# Patient Record
Sex: MALE | Race: BLACK OR AFRICAN AMERICAN | Employment: OTHER | URBAN - METROPOLITAN AREA
[De-identification: names, ages, dates, MRNs, and addresses within clinical notes are randomized per-mention and may not be internally consistent; named-entity substitution may affect disease eponyms.]

---

## 2019-08-17 ENCOUNTER — APPOINTMENT (OUTPATIENT)
Dept: GENERAL RADIOLOGY | Age: 59
End: 2019-08-17
Payer: COMMERCIAL

## 2019-08-17 ENCOUNTER — HOSPITAL ENCOUNTER (EMERGENCY)
Age: 59
Discharge: HOME | End: 2019-08-17
Attending: EMERGENCY MEDICINE
Payer: COMMERCIAL

## 2019-08-17 VITALS
BODY MASS INDEX: 30.8 KG/M2 | OXYGEN SATURATION: 96 % | HEIGHT: 74 IN | RESPIRATION RATE: 16 BRPM | TEMPERATURE: 98.1 F | SYSTOLIC BLOOD PRESSURE: 91 MMHG | WEIGHT: 240 LBS | HEART RATE: 88 BPM | DIASTOLIC BLOOD PRESSURE: 66 MMHG

## 2019-08-17 DIAGNOSIS — R52 BODY ACHES: Primary | ICD-10-CM

## 2019-08-17 DIAGNOSIS — N30.00 ACUTE CYSTITIS WITHOUT HEMATURIA: ICD-10-CM

## 2019-08-17 DIAGNOSIS — R11.2 NON-INTRACTABLE VOMITING WITH NAUSEA, UNSPECIFIED VOMITING TYPE: ICD-10-CM

## 2019-08-17 LAB
A/G RATIO: 1.4 (ref 1.1–2.2)
ALBUMIN SERPL-MCNC: 4.4 G/DL (ref 3.4–5)
ALP BLD-CCNC: 56 U/L (ref 40–129)
ALT SERPL-CCNC: 52 U/L (ref 10–40)
ANION GAP SERPL CALCULATED.3IONS-SCNC: 11 MMOL/L (ref 3–16)
AST SERPL-CCNC: 44 U/L (ref 15–37)
BASOPHILS ABSOLUTE: 0 K/UL (ref 0–0.2)
BASOPHILS RELATIVE PERCENT: 0.4 %
BILIRUB SERPL-MCNC: 0.9 MG/DL (ref 0–1)
BILIRUBIN URINE: NEGATIVE
BLOOD, URINE: NEGATIVE
BUN BLDV-MCNC: 24 MG/DL (ref 7–20)
CALCIUM SERPL-MCNC: 9.1 MG/DL (ref 8.3–10.6)
CHLORIDE BLD-SCNC: 102 MMOL/L (ref 99–110)
CLARITY: ABNORMAL
CO2: 25 MMOL/L (ref 21–32)
COLOR: YELLOW
COMMENT UA: ABNORMAL
CREAT SERPL-MCNC: 1.3 MG/DL (ref 0.8–1.3)
EOSINOPHILS ABSOLUTE: 0.1 K/UL (ref 0–0.6)
EOSINOPHILS RELATIVE PERCENT: 0.7 %
EPITHELIAL CELLS, UA: 3 /HPF (ref 0–5)
GFR AFRICAN AMERICAN: >60
GFR NON-AFRICAN AMERICAN: 56
GLOBULIN: 3.2 G/DL
GLUCOSE BLD-MCNC: 98 MG/DL (ref 70–99)
GLUCOSE URINE: NEGATIVE MG/DL
HCT VFR BLD CALC: 39.7 % (ref 40.5–52.5)
HEMOGLOBIN: 13.3 G/DL (ref 13.5–17.5)
HYALINE CASTS: 3 /LPF (ref 0–8)
KETONES, URINE: NEGATIVE MG/DL
LEUKOCYTE ESTERASE, URINE: ABNORMAL
LIPASE: 28 U/L (ref 13–60)
LYMPHOCYTES ABSOLUTE: 0.1 K/UL (ref 1–5.1)
LYMPHOCYTES RELATIVE PERCENT: 1.5 %
MCH RBC QN AUTO: 26.3 PG (ref 26–34)
MCHC RBC AUTO-ENTMCNC: 33.6 G/DL (ref 31–36)
MCV RBC AUTO: 78.3 FL (ref 80–100)
MICROSCOPIC EXAMINATION: YES
MONOCYTES ABSOLUTE: 0.7 K/UL (ref 0–1.3)
MONOCYTES RELATIVE PERCENT: 8.8 %
NEUTROPHILS ABSOLUTE: 7.5 K/UL (ref 1.7–7.7)
NEUTROPHILS RELATIVE PERCENT: 88.6 %
NITRITE, URINE: NEGATIVE
PDW BLD-RTO: 14.2 % (ref 12.4–15.4)
PH UA: 5.5 (ref 5–8)
PLATELET # BLD: 216 K/UL (ref 135–450)
PMV BLD AUTO: 7.1 FL (ref 5–10.5)
POTASSIUM REFLEX MAGNESIUM: 4.3 MMOL/L (ref 3.5–5.1)
PROTEIN UA: NEGATIVE MG/DL
RAPID INFLUENZA  B AGN: NEGATIVE
RAPID INFLUENZA A AGN: NEGATIVE
RBC # BLD: 5.07 M/UL (ref 4.2–5.9)
RBC UA: 3 /HPF (ref 0–4)
SODIUM BLD-SCNC: 138 MMOL/L (ref 136–145)
SPECIFIC GRAVITY UA: 1.01 (ref 1–1.03)
TOTAL PROTEIN: 7.6 G/DL (ref 6.4–8.2)
URINE REFLEX TO CULTURE: YES
URINE TYPE: ABNORMAL
UROBILINOGEN, URINE: 1 E.U./DL
WBC # BLD: 8.5 K/UL (ref 4–11)
WBC UA: 42 /HPF (ref 0–5)

## 2019-08-17 PROCEDURE — 81001 URINALYSIS AUTO W/SCOPE: CPT

## 2019-08-17 PROCEDURE — 6370000000 HC RX 637 (ALT 250 FOR IP): Performed by: EMERGENCY MEDICINE

## 2019-08-17 PROCEDURE — 71046 X-RAY EXAM CHEST 2 VIEWS: CPT

## 2019-08-17 PROCEDURE — 87086 URINE CULTURE/COLONY COUNT: CPT

## 2019-08-17 PROCEDURE — 6360000002 HC RX W HCPCS: Performed by: EMERGENCY MEDICINE

## 2019-08-17 PROCEDURE — 87804 INFLUENZA ASSAY W/OPTIC: CPT

## 2019-08-17 PROCEDURE — 99283 EMERGENCY DEPT VISIT LOW MDM: CPT

## 2019-08-17 PROCEDURE — 83690 ASSAY OF LIPASE: CPT

## 2019-08-17 PROCEDURE — 96361 HYDRATE IV INFUSION ADD-ON: CPT

## 2019-08-17 PROCEDURE — 87591 N.GONORRHOEAE DNA AMP PROB: CPT

## 2019-08-17 PROCEDURE — 80053 COMPREHEN METABOLIC PANEL: CPT

## 2019-08-17 PROCEDURE — 85025 COMPLETE CBC W/AUTO DIFF WBC: CPT

## 2019-08-17 PROCEDURE — 2580000003 HC RX 258: Performed by: EMERGENCY MEDICINE

## 2019-08-17 PROCEDURE — 96374 THER/PROPH/DIAG INJ IV PUSH: CPT

## 2019-08-17 PROCEDURE — 96375 TX/PRO/DX INJ NEW DRUG ADDON: CPT

## 2019-08-17 PROCEDURE — 87491 CHLMYD TRACH DNA AMP PROBE: CPT

## 2019-08-17 RX ORDER — KETOROLAC TROMETHAMINE 30 MG/ML
30 INJECTION, SOLUTION INTRAMUSCULAR; INTRAVENOUS ONCE
Status: COMPLETED | OUTPATIENT
Start: 2019-08-17 | End: 2019-08-17

## 2019-08-17 RX ORDER — 0.9 % SODIUM CHLORIDE 0.9 %
1000 INTRAVENOUS SOLUTION INTRAVENOUS ONCE
Status: COMPLETED | OUTPATIENT
Start: 2019-08-17 | End: 2019-08-17

## 2019-08-17 RX ORDER — DICLOFENAC SODIUM 75 MG/1
75 TABLET, DELAYED RELEASE ORAL 2 TIMES DAILY
COMMUNITY
End: 2020-12-10

## 2019-08-17 RX ORDER — CIPROFLOXACIN 500 MG/1
500 TABLET, FILM COATED ORAL ONCE
Status: COMPLETED | OUTPATIENT
Start: 2019-08-17 | End: 2019-08-17

## 2019-08-17 RX ORDER — IBUPROFEN 600 MG/1
600 TABLET ORAL EVERY 6 HOURS PRN
Qty: 30 TABLET | Refills: 0 | Status: SHIPPED | OUTPATIENT
Start: 2019-08-17 | End: 2020-01-13

## 2019-08-17 RX ORDER — OMEPRAZOLE 20 MG/1
40 CAPSULE, DELAYED RELEASE ORAL DAILY
COMMUNITY
End: 2020-01-13

## 2019-08-17 RX ORDER — ONDANSETRON 4 MG/1
4 TABLET, ORALLY DISINTEGRATING ORAL EVERY 8 HOURS PRN
Qty: 20 TABLET | Refills: 0 | Status: SHIPPED | OUTPATIENT
Start: 2019-08-17 | End: 2020-01-13

## 2019-08-17 RX ORDER — ONDANSETRON 2 MG/ML
4 INJECTION INTRAMUSCULAR; INTRAVENOUS ONCE
Status: COMPLETED | OUTPATIENT
Start: 2019-08-17 | End: 2019-08-17

## 2019-08-17 RX ORDER — CIPROFLOXACIN 500 MG/1
500 TABLET, FILM COATED ORAL 2 TIMES DAILY
Qty: 14 TABLET | Refills: 0 | Status: SHIPPED | OUTPATIENT
Start: 2019-08-17 | End: 2019-08-24

## 2019-08-17 RX ADMIN — KETOROLAC TROMETHAMINE 30 MG: 30 INJECTION, SOLUTION INTRAMUSCULAR at 04:52

## 2019-08-17 RX ADMIN — ONDANSETRON 4 MG: 2 INJECTION INTRAMUSCULAR; INTRAVENOUS at 04:50

## 2019-08-17 RX ADMIN — SODIUM CHLORIDE 1000 ML: 9 INJECTION, SOLUTION INTRAVENOUS at 04:49

## 2019-08-17 RX ADMIN — CIPROFLOXACIN HYDROCHLORIDE 500 MG: 500 TABLET, FILM COATED ORAL at 06:57

## 2019-08-17 ASSESSMENT — PAIN DESCRIPTION - DESCRIPTORS: DESCRIPTORS: ACHING

## 2019-08-17 ASSESSMENT — PAIN DESCRIPTION - LOCATION: LOCATION: GENERALIZED

## 2019-08-17 ASSESSMENT — PAIN DESCRIPTION - PROGRESSION: CLINICAL_PROGRESSION: RAPIDLY IMPROVING

## 2019-08-17 ASSESSMENT — PAIN SCALES - GENERAL
PAINLEVEL_OUTOF10: 8
PAINLEVEL_OUTOF10: 1

## 2019-08-18 LAB — URINE CULTURE, ROUTINE: NORMAL

## 2019-08-18 NOTE — ED PROVIDER NOTES
prescriptions below instructed to return if symptoms worsen or changes no indication for imaging test at this time  Old records reviewed. Labs and imaging reviewed and results discussed withpatient. Plan of care discussed with patient and family. Patient and family in agreement with plan. Patient was given scripts for the following medications. I counseled patient how to take these medications. Discharge Medication List as of 8/17/2019  6:42 AM      START taking these medications    Details   ciprofloxacin (CIPRO) 500 MG tablet Take 1 tablet by mouth 2 times daily for 7 days, Disp-14 tablet, R-0Print      ondansetron (ZOFRAN ODT) 4 MG disintegrating tablet Take 1 tablet by mouth every 8 hours as needed for Nausea, Disp-20 tablet, R-0Print      ibuprofen (ADVIL;MOTRIN) 600 MG tablet Take 1 tablet by mouth every 6 hours as needed for Pain, Disp-30 tablet, R-0Print             CLINICALIMPRESSION  1. Body aches    2. Non-intractable vomiting with nausea, unspecified vomiting type    3. Acute cystitis without hematuria        Blood pressure 91/66, pulse 88, temperature 98.1 °F (36.7 °C), resp. rate 16, height 6' 2\" (1.88 m), weight 240 lb (108.9 kg), SpO2 96 %. DISPOSITION   Shalom Cool was discharged to home  in stable condition.                  Kevin Osborn DO  08/18/19 3113

## 2019-08-20 LAB
C. TRACHOMATIS DNA ,URINE: NEGATIVE
N. GONORRHOEAE DNA, URINE: NEGATIVE

## 2020-01-13 ENCOUNTER — HOSPITAL ENCOUNTER (OUTPATIENT)
Dept: VASCULAR LAB | Age: 60
Discharge: HOME | End: 2020-01-13
Payer: COMMERCIAL

## 2020-01-13 ENCOUNTER — OFFICE VISIT (OUTPATIENT)
Dept: FAMILY MEDICINE CLINIC | Age: 60
End: 2020-01-13
Payer: COMMERCIAL

## 2020-01-13 VITALS
HEART RATE: 85 BPM | RESPIRATION RATE: 16 BRPM | DIASTOLIC BLOOD PRESSURE: 74 MMHG | WEIGHT: 245.1 LBS | OXYGEN SATURATION: 98 % | SYSTOLIC BLOOD PRESSURE: 118 MMHG | HEIGHT: 74 IN | BODY MASS INDEX: 31.46 KG/M2 | TEMPERATURE: 98.4 F

## 2020-01-13 PROCEDURE — 99204 OFFICE O/P NEW MOD 45 MIN: CPT | Performed by: FAMILY MEDICINE

## 2020-01-13 PROCEDURE — 93971 EXTREMITY STUDY: CPT

## 2020-01-13 RX ORDER — OMEPRAZOLE 40 MG/1
40 CAPSULE, DELAYED RELEASE ORAL DAILY
COMMUNITY
Start: 2019-11-11 | End: 2020-12-10

## 2020-01-13 RX ORDER — ATORVASTATIN CALCIUM 10 MG/1
10 TABLET, FILM COATED ORAL DAILY
COMMUNITY
Start: 2019-04-02 | End: 2020-03-02 | Stop reason: SDUPTHER

## 2020-01-13 RX ORDER — NAPROXEN 500 MG/1
500 TABLET ORAL 2 TIMES DAILY WITH MEALS
Qty: 30 TABLET | Refills: 1 | Status: SHIPPED | OUTPATIENT
Start: 2020-01-13 | End: 2020-03-02

## 2020-01-13 ASSESSMENT — ENCOUNTER SYMPTOMS
TROUBLE SWALLOWING: 0
BLOOD IN STOOL: 0
ABDOMINAL PAIN: 0
ANAL BLEEDING: 0
ABDOMINAL DISTENTION: 0
RECTAL PAIN: 0
BACK PAIN: 0
APNEA: 0
RHINORRHEA: 0
EYE DISCHARGE: 0
COLOR CHANGE: 0
COUGH: 0
PHOTOPHOBIA: 0
SHORTNESS OF BREATH: 0
WHEEZING: 0
CHOKING: 0
EYE ITCHING: 0
EYE REDNESS: 0
NAUSEA: 0
CHEST TIGHTNESS: 0
EYE PAIN: 0
DIARRHEA: 0
STRIDOR: 0
SORE THROAT: 0
SINUS PAIN: 0
FACIAL SWELLING: 0
CONSTIPATION: 0
SINUS PRESSURE: 0
VOICE CHANGE: 0
VOMITING: 0

## 2020-01-13 ASSESSMENT — PATIENT HEALTH QUESTIONNAIRE - PHQ9
SUM OF ALL RESPONSES TO PHQ QUESTIONS 1-9: 0
1. LITTLE INTEREST OR PLEASURE IN DOING THINGS: 0
SUM OF ALL RESPONSES TO PHQ QUESTIONS 1-9: 0
SUM OF ALL RESPONSES TO PHQ9 QUESTIONS 1 & 2: 0
2. FEELING DOWN, DEPRESSED OR HOPELESS: 0

## 2020-01-13 NOTE — PROGRESS NOTES
Subjective:      Patient ID: Gladis Don is a 62 y.o. male. HPI  Establish as new patient:relocating pcp due to convenience. Prior PCP notes via Care Everywhere reviewed. Presenting w/new complaint of right anterolateral leg pain & right posterior calf pain x 1month described as mild to moderate sharp-tight pain. Preceding injury:none recalled. Associated w/right calf swelling  Worsened(aggravated) by nothing,  Seen at local urgent care 2weeks ago & prescribed prednisone which helped minimally. Smoker:no. Denies left calf swelling/prolonged bed rest/recent air travel/calf injury/fhx of blood clotting or bleeding disorders/sob/cp/leg utjgjhuu-bqyysgqvsbv-emnnrnsvn/leg rash or ulcers/personal hx of DVT or PE/fever/chills/appetite decrease or loss. Hx of prostate cancer:2018:s/p tx:per urology monitoring. No other associated concerns. [address] [place] [address] Drive well. Taking medication without side effects. No other associated/worsening or other improving factors. Denies abdo pain/n-v/diarrhea/melena-blood in stool. Vit D deficiency:doing well. No new associated concerns. Denies bone pain/spontaneous fx. Hyperlipidemia:doing well. Associated w/nothing. Improving factors:diet regime/med. Worsening factors:none reported. Denies adbo pain/myalgias. No Known Allergies    Current Outpatient Medications on File Prior to Visit   Medication Sig Dispense Refill    atorvastatin (LIPITOR) 10 MG tablet Take 10 mg by mouth daily      omeprazole (PRILOSEC) 40 MG delayed release capsule Take 40 mg by mouth daily      IRON PO Take by mouth daily      diclofenac (VOLTAREN) 75 MG EC tablet Take 75 mg by mouth 2 times daily       No current facility-administered medications on file prior to visit. Past Medical History:   Diagnosis Date    Arthritis     Ganglion cyst     GERD (gastroesophageal reflux disease)     History of prostate cancer 2018    s/p tx: Under care of urology.     intolerance, polydipsia, polyphagia and polyuria. Genitourinary: Negative for decreased urine volume, difficulty urinating, discharge, dysuria, enuresis, flank pain, frequency, genital sores, hematuria, penile pain, penile swelling, scrotal swelling, testicular pain and urgency. Musculoskeletal: Positive for arthralgias. Negative for back pain, gait problem, joint swelling, myalgias, neck pain and neck stiffness. Skin: Negative for color change, pallor, rash and wound. Neurological: Negative for dizziness, tremors, seizures, syncope, facial asymmetry, speech difficulty, weakness, light-headedness, numbness and headaches. Negative for:Visual disturbance/muscular weakness/paralysis/memory problems. Hematological: Negative for adenopathy. Does not bruise/bleed easily. Negative for:Lymph node tenderness   Psychiatric/Behavioral: Negative for agitation, behavioral problems, confusion, decreased concentration, dysphoric mood, hallucinations, self-injury, sleep disturbance and suicidal ideas. The patient is not nervous/anxious and is not hyperactive. Negative for:Homicidal tendencies(HI)       Objective:   Physical Exam  Constitutional:       General: He is not in acute distress. Appearance: Normal appearance. He is well-developed. He is not diaphoretic. Comments: Well hydrated/well appearing. HENT:      Head: Normocephalic and atraumatic. Right Ear: Hearing, tympanic membrane, ear canal and external ear normal.      Left Ear: Hearing, tympanic membrane, ear canal and external ear normal.      Nose: Nose normal.      Mouth/Throat:      Pharynx: Uvula midline. No oropharyngeal exudate. Eyes:      General: Lids are normal.      Conjunctiva/sclera: Conjunctivae normal.      Pupils: Pupils are equal, round, and reactive to light. Neck:      Musculoskeletal: Full passive range of motion without pain, normal range of motion and neck supple.  No spinous process tenderness or muscular tenderness. Thyroid: No thyroid mass or thyromegaly. Vascular: No carotid bruit or JVD. Trachea: Trachea normal.   Cardiovascular:      Rate and Rhythm: Normal rate and regular rhythm. Pulses: Normal pulses. Femoral pulses are 2+ on the right side and 2+ on the left side. Popliteal pulses are 2+ on the right side and 2+ on the left side. Dorsalis pedis pulses are 2+ on the right side and 2+ on the left side. Posterior tibial pulses are 2+ on the right side and 2+ on the left side. Heart sounds: Normal heart sounds. Comments: No bilateral ankle edema. Right calf posterior swelling:minimal.  Negative Evie's sign bilaterally. No signs/sx/exam findings concerning for infection/compartment syndrome. Pulmonary:      Effort: Pulmonary effort is normal. No respiratory distress. Breath sounds: Normal breath sounds and air entry. Abdominal:      General: Bowel sounds are normal. There is no distension or abdominal bruit. Palpations: Abdomen is soft. Abdomen is not rigid. There is no hepatomegaly, splenomegaly or mass. Tenderness: There is no tenderness. There is no guarding.    Musculoskeletal:      Right shoulder: Normal.      Left shoulder: Normal.      Right elbow: Normal.     Left elbow: Normal.      Right wrist: Normal.      Left wrist: Normal.      Right hip: Normal.      Left hip: Normal.      Right knee: Normal.      Left knee: Normal.      Right ankle: Normal.      Left ankle: Normal.      Cervical back: Normal.      Thoracic back: Normal.      Lumbar back: Normal.      Right upper arm: Normal.      Left upper arm: Normal.      Right forearm: Normal.      Left forearm: Normal.      Right hand: Normal.      Left hand: Normal.      Right upper leg: Normal.      Left upper leg: Normal.      Right lower leg: Normal.      Left lower leg: Normal.      Right foot: Normal.      Left foot: Normal.   Lymphadenopathy:      Head:      Right

## 2020-01-13 NOTE — PATIENT INSTRUCTIONS
cookies. · Bake, broil, or steam foods. Don't zaman them. · Be physically active. Get at least 30 minutes of exercise on most days of the week. Walking is a good choice. You also may want to do other activities, such as running, swimming, cycling, or playing tennis or team sports. · Stay at a healthy weight or lose weight by making the changes in eating and physical activity listed above. Losing just a small amount of weight, even 5 to 10 pounds, can reduce your risk for having a heart attack or stroke. · Do not smoke. When should you call for help? Watch closely for changes in your health, and be sure to contact your doctor if:    · You need help making lifestyle changes.     · You have questions about your medicine. Where can you learn more? Go to [email or web address] and sign in to your [facility] account. Enter L966 in the TabUp box to learn more about \"High Cholesterol: Care Instructions. \"     If you do not have an account, please click on the \"Sign Up Now\" link. Current as of: April 9, 2019  Content Version: 12.3  © 6432-9503 Healthwise, Incorporated. Care instructions adapted under license by [facility] ([facility]). If you have questions about a medical condition or this instruction, always ask your healthcare professional. [address] any warranty or liability for your use of this information.

## 2020-03-02 ENCOUNTER — OFFICE VISIT (OUTPATIENT)
Dept: FAMILY MEDICINE CLINIC | Age: 60
End: 2020-03-02
Payer: COMMERCIAL

## 2020-03-02 VITALS
OXYGEN SATURATION: 98 % | DIASTOLIC BLOOD PRESSURE: 67 MMHG | HEART RATE: 74 BPM | WEIGHT: 244.8 LBS | TEMPERATURE: 98.2 F | HEIGHT: 74 IN | BODY MASS INDEX: 31.42 KG/M2 | RESPIRATION RATE: 16 BRPM | SYSTOLIC BLOOD PRESSURE: 104 MMHG

## 2020-03-02 PROCEDURE — 99214 OFFICE O/P EST MOD 30 MIN: CPT | Performed by: FAMILY MEDICINE

## 2020-03-02 RX ORDER — MELATONIN
1000 DAILY
Qty: 30 TABLET | Refills: 2 | Status: SHIPPED | OUTPATIENT
Start: 2020-03-02 | End: 2020-03-02 | Stop reason: SDUPTHER

## 2020-03-02 RX ORDER — MELATONIN
4000 DAILY
Qty: 30 TABLET | Refills: 2 | Status: SHIPPED | OUTPATIENT
Start: 2020-03-02 | End: 2020-12-10

## 2020-03-02 RX ORDER — ATORVASTATIN CALCIUM 20 MG/1
20 TABLET, FILM COATED ORAL DAILY
Qty: 30 TABLET | Refills: 2 | Status: SHIPPED | OUTPATIENT
Start: 2020-03-02 | End: 2020-12-10

## 2020-03-02 RX ORDER — METHYLPREDNISOLONE 4 MG/1
TABLET ORAL
Qty: 1 KIT | Refills: 0 | Status: SHIPPED | OUTPATIENT
Start: 2020-03-02 | End: 2020-04-23

## 2020-03-02 ASSESSMENT — ENCOUNTER SYMPTOMS
RECTAL PAIN: 0
SHORTNESS OF BREATH: 0
STRIDOR: 0
APNEA: 0
VOMITING: 0
ANAL BLEEDING: 0
DIARRHEA: 0
COUGH: 0
WHEEZING: 0
NAUSEA: 0
ABDOMINAL DISTENTION: 0
BACK PAIN: 1
ABDOMINAL PAIN: 0
CONSTIPATION: 0
BLOOD IN STOOL: 0
CHEST TIGHTNESS: 0
CHOKING: 0

## 2020-03-02 NOTE — PROGRESS NOTES
Subjective:      Patient ID: Antione Bowman is a 62 y.o. male. HPI     Results for Particia Kong (MRN [id number]) as of 3/2/2020 07:50   Ref.  Range 2/8/2020 08:14   Sodium Latest Ref Range: 134 - 144 mmol/L 143   Potassium Latest Ref Range: 3.5 - 5.2 mmol/L 4.8   Chloride Latest Ref Range: 96 - 106 mmol/L 105   CO2 Latest Ref Range: 20 - 29 mmol/L 23   BUN Latest Ref Range: 8 - 27 mg/dL 16   Creatinine Latest Ref Range: 0.76 - 1.27 mg/dL 1.08   BUN/Creatinine Ratio Latest Ref Range: 10 - 24  15   GFR Non- Latest Ref Range: >59 mL/min/1.73 74   GFR  Latest Ref Range: >59 mL/min/1.73 85   Glucose Latest Ref Range: 65 - 99 mg/dL 85   Calcium Latest Ref Range: 8.6 - 10.2 mg/dL 9.2   Total Protein Latest Ref Range: 6.0 - 8.5 g/dL 6.6   Cholesterol, Total Latest Ref Range: 100 - 199 mg/dL 214 (H)   HDL Cholesterol Latest Ref Range: >39 mg/dL 49   LDL Calculated Latest Ref Range: 0 - 99 mg/dL 152 (H)   Triglycerides Latest Ref Range: 0 - 149 mg/dL 65   VLDL Cholesterol Calculated Latest Ref Range: 5 - 40 mg/dL 13   Albumin Latest Ref Range: 3.8 - 4.8 g/dL 4.3   Globulin Latest Ref Range: 1.5 - 4.5 g/dL 2.3   Albumin/Globulin Ratio Latest Ref Range: 1.2 - 2.2  1.9   Alk Phos Latest Ref Range: 39 - 117 IU/L 52   ALT Latest Ref Range: 0 - 44 IU/L 28   AST Latest Ref Range: 0 - 40 IU/L 23   Bilirubin Latest Ref Range: 0.0 - 1.2 mg/dL 0.5   Vit D, 25-Hydroxy Latest Ref Range: 30.0 - 100.0 ng/mL 21.1 (L)   Comment Unknown CANCELED       Radiation Dose Estimates     No radiation information found for this patient   Narrative   Lower Extremities DVT Study        Demographics        Patient Name       SWEAT PARMINDER        Date of Study      01/13/2020         Gender              Male        Patient Number     [id number]         Date of Birth       1960        Visit Number       [id number]          Age                 59 year(s)        Accession Number   [id number]          Room Number         Results for Brett Ayon (MRN [id number]) as of 3/2/2020 07:50   Ref. Range 2/8/2020 08:14   Sodium Latest Ref Range: 134 - 144 mmol/L 143   Potassium Latest Ref Range: 3.5 - 5.2 mmol/L 4.8   Chloride Latest Ref Range: 96 - 106 mmol/L 105   CO2 Latest Ref Range: 20 - 29 mmol/L 23   BUN Latest Ref Range: 8 - 27 mg/dL 16   Creatinine Latest Ref Range: 0.76 - 1.27 mg/dL 1.08   BUN/Creatinine Ratio Latest Ref Range: 10 - 24  15   GFR Non- Latest Ref Range: >59 mL/min/1.73 74   GFR  Latest Ref Range: >59 mL/min/1.73 85   Glucose Latest Ref Range: 65 - 99 mg/dL 85   Calcium Latest Ref Range: 8.6 - 10.2 mg/dL 9.2   Total Protein Latest Ref Range: 6.0 - 8.5 g/dL 6.6   Cholesterol, Total Latest Ref Range: 100 - 199 mg/dL 214 (H)   HDL Cholesterol Latest Ref Range: >39 mg/dL 49   LDL Calculated Latest Ref Range: 0 - 99 mg/dL 152 (H)   Triglycerides Latest Ref Range: 0 - 149 mg/dL 65   VLDL Cholesterol Calculated Latest Ref Range: 5 - 40 mg/dL 13   Albumin Latest Ref Range: 3.8 - 4.8 g/dL 4.3   Globulin Latest Ref Range: 1.5 - 4.5 g/dL 2.3   Albumin/Globulin Ratio Latest Ref Range: 1.2 - 2.2  1.9   Alk Phos Latest Ref Range: 39 - 117 IU/L 52   ALT Latest Ref Range: 0 - 44 IU/L 28   AST Latest Ref Range: 0 - 40 IU/L 23   Bilirubin Latest Ref Range: 0.0 - 1.2 mg/dL 0.5   Vit D, 25-Hydroxy Latest Ref Range: 30.0 - 100.0 ng/mL 21.1 (L)   Comment Unknown CANCELED     Vit D deficiency:doing well. See labs above. Takes 2000iu daily. No other new associated concerns. Denies bone pain/spontaneous fx. F/u to:right anterolateral leg pain & right posterior calf pain [id number] described as mild to moderate sharp-tight pain:pain is only when sitting/lying at night. See doppler US above:negative DVT. Preceding injury:none recalled. Associated w/right calf swelling that is almost better. Has intermittent sciatica right sided >2years:no PT for this.  Occasional low back tobacco: Never Used   Substance Use Topics    Alcohol use: Not Currently    Drug use: Never     Social History     Substance and Sexual Activity   Drug Use Never           Review of Systems   Constitutional: Negative for activity change, appetite change, chills, diaphoresis, fatigue, fever and unexpected weight change. Eyes: Negative for visual disturbance. Respiratory: Negative for apnea, cough, choking, chest tightness, shortness of breath, wheezing and stridor. Cardiovascular: Negative for chest pain, palpitations and leg swelling. Gastrointestinal: Negative for abdominal distention, abdominal pain, anal bleeding, blood in stool, constipation, diarrhea, nausea, rectal pain and vomiting. Endocrine: Negative for cold intolerance, heat intolerance, polydipsia, polyphagia and polyuria. Genitourinary: Negative. Negative for decreased urine volume, difficulty urinating, discharge, dysuria, enuresis, flank pain, frequency, genital sores, hematuria, penile pain, penile swelling, scrotal swelling, testicular pain and urgency. Musculoskeletal: Positive for back pain. Skin: Negative for pallor, rash and wound. Neurological: Negative for dizziness, tremors, seizures, syncope, facial asymmetry, speech difficulty, weakness, light-headedness and headaches. Hematological: Negative for adenopathy. Psychiatric/Behavioral: Negative for sleep disturbance. Objective:   Physical Exam  Constitutional:       General: He is not in acute distress. Appearance: Normal appearance. He is well-developed. HENT:      Nose: Nose normal.      Mouth/Throat:      Pharynx: Uvula midline. Eyes:      General: Lids are normal.      Conjunctiva/sclera: Conjunctivae normal.      Pupils: Pupils are equal, round, and reactive to light. Neck:      Musculoskeletal: Normal range of motion and neck supple. Vascular: No carotid bruit or JVD.       Trachea: Trachea normal.   Cardiovascular:      Rate and Rhythm: Normal rate and regular rhythm. Pulses: Normal pulses. Popliteal pulses are 2+ on the right side and 2+ on the left side. Dorsalis pedis pulses are 2+ on the right side and 2+ on the left side. Posterior tibial pulses are 2+ on the right side and 2+ on the left side. Heart sounds: Normal heart sounds. No murmur. No gallop. Comments: No leg-ankle edema. Pulmonary:      Effort: Pulmonary effort is normal.      Breath sounds: Normal breath sounds and air entry. Abdominal:      General: Bowel sounds are normal. There is no distension. Palpations: Abdomen is soft. There is no hepatomegaly, splenomegaly or mass. Tenderness: There is no abdominal tenderness. There is no guarding or rebound. Musculoskeletal:      Right hip: Normal.      Left hip: Normal.      Right knee: Normal.      Left knee: Normal.      Right ankle: Normal. Achilles tendon normal.      Left ankle: Normal. Achilles tendon normal.      Cervical back: Normal.      Thoracic back: Normal.      Lumbar back: Normal.        Back:       Right upper leg: Normal.      Left upper leg: Normal.      Right lower leg: Normal.      Left lower leg: Normal.      Right foot: Normal.      Left foot: Normal.      Comments: X marks area of pt's back pain:no TTP. Negative SLR bilaterally. No signs/sx/exam findings concerning for cauda equina syndrome. Lymphadenopathy:      Cervical: No cervical adenopathy. Right cervical: No superficial cervical adenopathy. Comments: No supraclavicular LAD. Skin:     General: Skin is warm and dry. Comments: Good skin turgor. Capillary refill=2-3 secs. Neurological:      Mental Status: He is alert and oriented to person, place, and time. Deep Tendon Reflexes: Reflexes are normal and symmetric. Psychiatric:         Mood and Affect: Mood normal.         Behavior: Behavior is cooperative. Comments: Good eye contact.          Assessment:       Diagnosis Orders   1. Mixed hyperlipidemia  VSS/well appearing. Not at goal.  The patient is asked to make an attempt to improve diet and exercise patterns to aid in medical management of this problem. Increase statin to 20mg:labs in 2mos. Comprehensive Metabolic Panel    Lipid Panel    atorvastatin (LIPITOR) 20 MG tablet    VL NOAH BILATERAL LIMITED 1-2 LEVELS   2. Calf swelling:right  Improving but still has anterolateral right leg pain/sciatica type sx. Has multiple risk factors for PAD-claudication type sx:will check ABPI. Start PT/consult ortho regarding back pain-right sciatica. Naproxen prn. Check L-spine xray. Home exercises. [facility] Physical Therapy [facility]    VL NOAH BILATERAL LIMITED 1-2 LEVELS   3. Gastroesophageal reflux disease without esophagitis  Stable. 4. Sciatica, right side  See note#2. [facility] Physical [facility]    methylPREDNISolone (MEDROL MARIANA,) 4 MG tablet    [facility]   5. Vitamin D deficiency  Not at goal. Increase from 2ooo to 4000iu daily. Lab check in 1mo. Vitamin D 25 Hydroxy    Cholecalciferol (VITAMIN D3) 25 MCG (1000 UT) TABS       6. History of prostate cancer  Per specialist.     7. Right calf pain  See note#2. [facility] Physical Therapy [facility]    VL NOAH BILATERAL LIMITED 1-2 LEVELS   8. Right leg pain  [facility] Physical [facility]    VL NOAH BILATERAL LIMITED 1-2 LEVELS   9. Chronic right-sided low back pain with right-sided sciatica  See note#2. [facility] Physical Therapy - [place]    XR LUMBAR SPINE (2-3 VIEWS)    methylPREDNISolone (MEDROL, MARIANA,) 4 MG tablet    [employer] [facility]   10. ?Claudication of lower extremity ([address]  See note#2. VL NOAH BILATERAL LIMITED 1-2 LEVELS   11. Class 1 obesity due to excess calories without serious comorbidity with body mass index (BMI) of 30.0 to 30.9 in adult  Diet & exercise regime reviewed.              Plan:      Obtain labs/diagnostic tests as discussed today & call back for

## 2020-03-06 ENCOUNTER — OFFICE VISIT (OUTPATIENT)
Dept: ORTHOPEDIC SURGERY | Age: 60
End: 2020-03-06
Payer: COMMERCIAL

## 2020-03-06 VITALS — BODY MASS INDEX: 31.83 KG/M2 | HEIGHT: 74 IN | RESPIRATION RATE: 17 BRPM | WEIGHT: 248 LBS

## 2020-03-06 PROCEDURE — 99243 OFF/OP CNSLTJ NEW/EST LOW 30: CPT | Performed by: PHYSICIAN ASSISTANT

## 2020-03-09 ENCOUNTER — TELEPHONE (OUTPATIENT)
Dept: ORTHOPEDIC SURGERY | Age: 60
End: 2020-03-09

## 2020-03-09 NOTE — TELEPHONE ENCOUNTER
Called and left  for patient letting him know that images was approved through his insurance. [facility]. We will call with results or he can come back into the office.      [facility] [employer] - [phone]    MRI LUMBAR SPINE W/O CONTRAST IS Steffanie Gonsales #   [id number], DATES 3/9 THRU 4/7

## 2020-04-02 ENCOUNTER — TELEPHONE (OUTPATIENT)
Dept: ORTHOPEDIC SURGERY | Age: 60
End: 2020-04-02

## 2020-04-07 ENCOUNTER — TELEPHONE (OUTPATIENT)
Dept: ORTHOPEDIC SURGERY | Age: 60
End: 2020-04-07

## 2020-04-07 NOTE — TELEPHONE ENCOUNTER
----- Message from Yuki Guzman PA-C sent at 4/6/2020  2:35 PM EDT -----  Please call patient with MRI results. They show multilevel degenerative disc disease and protrusions causing moderate central canal stenosis at L4-5 and multilevel foraminal narrowing throughout. He is ultimately interested in [facility] so we can place referral, but procedure will likely not happen until after COVID-19 restrictions are lifted.

## 2020-04-22 ENCOUNTER — TELEPHONE (OUTPATIENT)
Dept: ORTHOPEDIC SURGERY | Age: 60
End: 2020-04-22

## 2020-04-22 NOTE — TELEPHONE ENCOUNTER
PATIENT HAS A NEW PATIENT APPOINTMENT SCHEDULED ON THURSDAY, 4/23/20 @ 1:15 IN THE [facility] [phone]

## 2020-04-23 ENCOUNTER — TELEPHONE (OUTPATIENT)
Dept: ORTHOPEDIC SURGERY | Age: 60
End: 2020-04-23

## 2020-04-23 ENCOUNTER — OFFICE VISIT (OUTPATIENT)
Dept: ORTHOPEDIC SURGERY | Age: 60
End: 2020-04-23
Payer: COMMERCIAL

## 2020-04-23 VITALS — RESPIRATION RATE: 14 BRPM | HEIGHT: 74 IN | WEIGHT: 249 LBS | BODY MASS INDEX: 31.95 KG/M2

## 2020-04-23 PROCEDURE — 99244 OFF/OP CNSLTJ NEW/EST MOD 40: CPT | Performed by: PHYSICAL MEDICINE & REHABILITATION

## 2020-04-23 NOTE — LETTER
Please schedule the following with:     Date:   20     Account: [de-identified]  Patient: Alirio Cool    : 1960  Address:  [address] [place] [address]    Phone (H):  [phone] (home)      ----------------------------------------------------------------------------------------------  Diagnosis:     ICD-10-CM    1. Lumbar radiculopathy M54.16    2. HNP (herniated nucleus pulposus), lumbar M51.26    3. Lumbar foraminal stenosis M48.061    4. DDD (degenerative disc disease), lumbar M51.36          Levels: L4 and L5   Procedure type: Transforaminal Epidural Steroid Injection  Side: Right   CPT Codes 94792, 64413    ----------------------------------------------------------------------------------------------  Injection # 1   [facility]    Attending Physician       Lanny Montes.  Valerie Miranda MD.      ----------------------------------------------------------------------------------------------  Injection Scheduled For:    At:    1st Insurance [employer]    Pre-Cert#    2nd Insurance NONE    Pre-Cert#    Comments or Special instructions:    · Infection control  · Tested positive for MRSA in past 12 months:  no  · Tested positive for MSSA \"staph infection\" in past 12 months: no  · Tested positive for VRE (Vancomycin Resistant Enterococci) in past 12 months:   no  · Currently on any antibiotics for an infection: no  · Anticoagulants:  · On a blood thinner:  no   · Any history of bleeding disorder: no   · Advanced Liver disease: no   · Advanced Renal disease: no   · Glaucoma: no   · Diabetes: no     Sedation:  Yes  -----------------------------------------------------------------------------------------------  No Known Allergies

## 2020-04-23 NOTE — PROGRESS NOTES
New Patient: SPINE    Referring Provider:  Magda White PA-C    Chief Complaint   Patient presents with    Lower Back Pain     NP, LSP    Leg Pain     Right lower leg pain       HISTORY OF PRESENT ILLNESS:      · The patient is being sent at the request of Magda White PA-C in consultation as a new spine patient for low back pain and right leg pain The patient is a 62 y.o. 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 There was not an accident or injury to be involved with his pain. He describes that the pain will radiate down the right leg and into the calf. Patient describes his pain today is a 2/10 in severity. He describes the pain to be a numbness with pins-and-needles running down the right leg to the calf. He describes his pain is persistent yet intermittent. Aggravating factors include bending, sitting, and lying down. This is limiting to his behavior. Relieving factors do include rest.    · The patient was referred to our office by the surgical team after the patient was evaluated and an MRI of the lumbar spine was completed. He is not a surgical candidate at this time and he has had epidural steroid injections in the past which have provided relief. The patient has tried oral prednisone which did not help with his symptoms he continues to have lower back and leg pain. He describes that not working has improved with the pain but he continues to have the lower back pain with radiating leg pain into the right calf. Pain Assessment  Location of Pain: Back(LSP)  Location Modifiers: Right, Posterior(Lower leg)  Severity of Pain: 2  Quality of Pain: Other (Comment)(Numbness; Pins and needles)  Duration of Pain: Persistent  Frequency of Pain: Intermittent  Aggravating Factors:  Other (Comment), Bending(Sitting or lying down)  Limiting Behavior: Yes  Relieving Factors: Rest  Result of Injury: No  Work-Related Injury: enlargement or induration   Sensation is intact without deficit in the upper and lower extremities to light touch and pinprick  · Coordination of the upper and lower extremities are normal.    CERVICAL EXAMINATION:  · Inspection: Local inspection shows no step-off or bruising. Cervical alignment is normal. No instability is noted. · Palpation and Percussion: No evidence of tenderness at the midline. Paraspinal tenderness is not present. There is no paraspinal spasm. · Range of Motion:  pain-free ROM   · Strength: 5/5 bilateral upper extremities  · Special Tests:   Spurling's and Gannon's are negative bilaterally. Neville and Impingement tests are negative bilaterally. · Skin:There are no rashes, ulcerations or lesions. · Reflexes: Bilaterally triceps, biceps and brachioradialis are 2+. Clonus absent bilaterally at the feet. No pathological reflexes are noted. · Gait & station:  normal, patient ambulates without assistance and no ataxia  · Additional Examinations:  · RIGHT UPPER EXTREMITY:  Inspection/examination of the right upper extremity does not show any tenderness, deformity or injury. Range of motion is normal and pain-free. There is no gross instability. There are no rashes, ulcerations or lesions. Strength and tone are normal. No atrophy or abnormal movements are noted. · LEFT UPPER EXTREMITY: Inspection/examination of the left upper extremity does not show any tenderness, deformity or injury. Range of motion is normal and pain-free. There is no gross instability. There are no rashes, ulcerations or lesions. Strength and tone are normal. No atrophy or abnormal movements are noted. LUMBAR/SACRAL EXAMINATION:  · Inspection: Local inspection shows no step-off or bruising. Lumbar alignment is normal. No instability is noted. · Palpation:   No evidence of tenderness at the midline.  Lumbar paraspinal tenderness Mild L4/5 and L5/S1 tenderness  Bursal tenderness No tenderness bilaterally  There nerve roots.     Moderate facet hypertrophy.  Mild spinal stenosis.       L1-2: Disc height reduction; bilobed disc protrusion gently encroaches upon right and left    ventral dural sac; near to if not contacting L2 nerve roots.  Moderate facet hypertrophy.  Mild    bilateral lateral recess stenosis.       L2-3: Disc height reduction; disc bulge asymmetric to left gently encroaches upon ventral and    left ventral dural sac and left L3 nerve root.  Moderate facet hypertrophy.  Mild spinal    stenosis.  Mild bilateral lateral recess stenosis.  Mild biforaminal stenosis.       L3-4: Disc height reduction; disc bulge asymmetric to right gently encroaches upon right    ventral dural sac and right L4 nerve root.  Moderate facet hypertrophy.  Mild spinal stenosis.     Mild right lateral recess stenosis.  Mild right foraminal stenosis.       L4-5: Disc height reduction; moderate central and right disc protrusion covered by L4 inferior    endplate osteophyte deforms ventral and right ventral dural sac and right L5 nerve root;    contacts left L5 nerve root.  Moderate facet hypertrophy.  Moderate spinal stenosis.  Moderate    right, mild left lateral recess stenosis.  Mild biforaminal stenosis.       L5-S1: Disc height reduction; vacuum phenomenon; 6mm retrolisthesis; listhesed disc contacts    ventral dural sac and left S1 nerve root; encroaches upon foraminal L5 nerve roots.  Moderate    left lateral recess narrowing.  Mild right lateral recess narrowing.  Mild spinal stenosis.     Moderate facet hypertrophy.  Moderate biforaminal narrowing.       No focal bone lesion to suggest acute osseous stress injury or acute stress fracture.       Hips are characterized in part.  Mild joint space narrowing and femoroacetabular osteophytes.       No focal bone lesion to suggest neoplasm or fracture.       CONCLUSION:   1.  L4-5 moderate central and right lateral protrusion covered by L4 inferior endplate    osteophyte deforms ventral

## 2020-06-03 ENCOUNTER — TELEPHONE (OUTPATIENT)
Dept: ORTHOPEDIC SURGERY | Age: 60
End: 2020-06-03

## 2020-06-03 NOTE — TELEPHONE ENCOUNTER
L/M FOR PATIENT regarding scheduling procedures. Requested patient contact the office if interested in pursuing scheduling.

## 2020-06-04 ENCOUNTER — TELEPHONE (OUTPATIENT)
Dept: ORTHOPEDIC SURGERY | Age: 60
End: 2020-06-04

## 2020-06-08 ENCOUNTER — TELEPHONE (OUTPATIENT)
Dept: ORTHOPEDIC SURGERY | Age: 60
End: 2020-06-08

## 2020-06-10 ENCOUNTER — TELEPHONE (OUTPATIENT)
Dept: ORTHOPEDIC SURGERY | Age: 60
End: 2020-06-10

## 2020-06-10 NOTE — TELEPHONE ENCOUNTER
Patient sent [employer] message asking to schedule backlogged procedure due to [address] closures. Patient was informed we have tried to contact him a few times in regards to rescheduling. He was advised via [employer] that our office will attempt to contact him again. Please call patient to schedule.

## 2020-06-17 NOTE — PROGRESS NOTES
PATIENT REACHED   YES____NO_X___    PREOP INSTUCTIONS LEFT ON  [phone]_____________  Patient instructed to get their COVID-19 test done as directed by their doctor (5-7 days prior to procedure)  or patient states will get on __6/18-19________. I The day the COVID test is done is considered day one. Instructed to self quarantine after test until DOS. There is a one visitor policy at [facility] for the pre-op phase only for surgery and endoscopy cases. The visitor is expected to leave the facility after the patient is taken back for the procedure. Pain management is NO VISITOR policyThe patients ride is expected to remain in the car with a cell phone for communication. If the ride is leaving the hospital grounds please make sure they are back in time for pickup. Have the patient inform the staff on arrival what their rides plans are while the patient is in the facility. At the MAIN there is one visitor allowed. Please note that the visitor policy is subject to change. DATE__6/24_______ TIME__1110_______ARRIVAL__1010______PLACE_masc___________  NOTHING TO EAT OR DRINK  AFTER MIDNIGHT THE EVENING PRIOR OR AS INSTRUCTED BY YOUR DR.  Eloina Barrett NEED A RESPONSIBLE ADULT AGE 18 OR OLDER TO DRIVE YOU HOME  PLEASE BRING INSURANCE CARD. PICTURE ID AND COMPLETE LIST OF MEDS  WEAR LOOSE COMFORTABLE CLOTHING  FOLLOW ANY INSTRUCTIONS YOUR [facility] OFFICE HAS GIVEN YOU,INCLUDING WHAT MEDICATIONS TO TAKE THE AM OF PROCEDURE AND WHEN AND IF YOU NEED TO STOP ANY BLOOD THINNERS. IF YOU HAVE QUESTIONS REGARDING THIS CALL THE OFFICE  THE GOAL BLOOD SUGAR THE AM OF PROCEDURE  OR LESS ABOVE THAT THE PROCEDURE MAY BE CANCELLED  ANY QUESTIONS CALL YOUR DOCTOR. ALSO,PLEASE READ THE INSTRUCTION PACKET FROM YOUR DR IF YOU RECEIVED ONE.   SPINE INTERVENTION NUMBER [phone]

## 2020-06-18 ENCOUNTER — OFFICE VISIT (OUTPATIENT)
Dept: PRIMARY CARE CLINIC | Age: 60
End: 2020-06-18

## 2020-06-19 ENCOUNTER — TELEPHONE (OUTPATIENT)
Dept: ORTHOPEDIC SURGERY | Age: 60
End: 2020-06-19

## 2020-06-20 LAB
SARS-COV-2: NOT DETECTED
SOURCE: NORMAL

## 2020-06-24 ENCOUNTER — APPOINTMENT (OUTPATIENT)
Dept: GENERAL RADIOLOGY | Age: 60
End: 2020-06-24
Attending: PHYSICAL MEDICINE & REHABILITATION
Payer: COMMERCIAL

## 2020-06-24 ENCOUNTER — HOSPITAL ENCOUNTER (OUTPATIENT)
Age: 60
Setting detail: OUTPATIENT SURGERY
Discharge: HOME | End: 2020-06-24
Attending: PHYSICAL MEDICINE & REHABILITATION | Admitting: PHYSICAL MEDICINE & REHABILITATION
Payer: COMMERCIAL

## 2020-06-24 VITALS
TEMPERATURE: 97.6 F | DIASTOLIC BLOOD PRESSURE: 83 MMHG | BODY MASS INDEX: 32.08 KG/M2 | RESPIRATION RATE: 18 BRPM | SYSTOLIC BLOOD PRESSURE: 114 MMHG | WEIGHT: 250 LBS | HEART RATE: 66 BPM | HEIGHT: 74 IN | OXYGEN SATURATION: 99 %

## 2020-06-24 PROCEDURE — 3209999900 FLUORO FOR SURGICAL PROCEDURES

## 2020-06-24 PROCEDURE — 99152 MOD SED SAME PHYS/QHP 5/>YRS: CPT | Performed by: PHYSICAL MEDICINE & REHABILITATION

## 2020-06-24 PROCEDURE — 6360000002 HC RX W HCPCS: Performed by: PHYSICAL MEDICINE & REHABILITATION

## 2020-06-24 PROCEDURE — 2500000003 HC RX 250 WO HCPCS: Performed by: PHYSICAL MEDICINE & REHABILITATION

## 2020-06-24 PROCEDURE — 2709999900 HC NON-CHARGEABLE SUPPLY: Performed by: PHYSICAL MEDICINE & REHABILITATION

## 2020-06-24 PROCEDURE — 3610000056 HC PAIN LEVEL 4 BASE (NON-OR): Performed by: PHYSICAL MEDICINE & REHABILITATION

## 2020-06-24 RX ORDER — BUPIVACAINE HYDROCHLORIDE 5 MG/ML
INJECTION, SOLUTION EPIDURAL; INTRACAUDAL
Status: COMPLETED | OUTPATIENT
Start: 2020-06-24 | End: 2020-06-24

## 2020-06-24 RX ORDER — BETAMETHASONE SODIUM PHOSPHATE AND BETAMETHASONE ACETATE 3; 3 MG/ML; MG/ML
INJECTION, SUSPENSION INTRA-ARTICULAR; INTRALESIONAL; INTRAMUSCULAR; SOFT TISSUE
Status: COMPLETED | OUTPATIENT
Start: 2020-06-24 | End: 2020-06-24

## 2020-06-24 RX ORDER — MIDAZOLAM HYDROCHLORIDE 1 MG/ML
INJECTION INTRAMUSCULAR; INTRAVENOUS
Status: COMPLETED | OUTPATIENT
Start: 2020-06-24 | End: 2020-06-24

## 2020-06-24 RX ORDER — FENTANYL CITRATE 50 UG/ML
INJECTION, SOLUTION INTRAMUSCULAR; INTRAVENOUS
Status: COMPLETED | OUTPATIENT
Start: 2020-06-24 | End: 2020-06-24

## 2020-06-24 ASSESSMENT — PAIN SCALES - GENERAL
PAINLEVEL_OUTOF10: 0
PAINLEVEL_OUTOF10: 0

## 2020-06-24 ASSESSMENT — PAIN - FUNCTIONAL ASSESSMENT: PAIN_FUNCTIONAL_ASSESSMENT: 0-10

## 2020-07-02 ENCOUNTER — OFFICE VISIT (OUTPATIENT)
Dept: PRIMARY CARE CLINIC | Age: 60
End: 2020-07-02
Payer: COMMERCIAL

## 2020-07-02 PROCEDURE — 99211 OFF/OP EST MAY X REQ PHY/QHP: CPT | Performed by: NURSE PRACTITIONER

## 2020-07-02 NOTE — PROGRESS NOTES
Shalom Cool received a viral test for COVID-19. They were educated on isolation and quarantine as appropriate. For any symptoms, they were directed to seek care from their PCP, given contact information to establish with a doctor, directed to an urgent care or the emergency room.

## 2020-07-05 LAB
SARS-COV-2: NOT DETECTED
SOURCE: NORMAL

## 2020-07-07 ENCOUNTER — TELEPHONE (OUTPATIENT)
Dept: ORTHOPEDIC SURGERY | Age: 60
End: 2020-07-07

## 2020-07-07 NOTE — TELEPHONE ENCOUNTER
This is being denied because criteria not met. This injection should be given again if it has been 2 weeks since the last injection.  Can do a P2P by calling [phone] ref # [id number]    Auth: # Denied    Date: 07/08/2020  Type of SX:  OP  Location: [facility]  CPT: 82781.05   DX Code: M54.16   M51.26   M48.061   M51.36  SX area: Bilateral L5 Trans foraminal HAYDEN  Insurance: [facility]

## 2020-07-08 ENCOUNTER — APPOINTMENT (OUTPATIENT)
Dept: GENERAL RADIOLOGY | Age: 60
End: 2020-07-08
Attending: PHYSICAL MEDICINE & REHABILITATION
Payer: COMMERCIAL

## 2020-07-08 ENCOUNTER — HOSPITAL ENCOUNTER (OUTPATIENT)
Age: 60
Setting detail: OUTPATIENT SURGERY
Discharge: HOME | End: 2020-07-08
Attending: PHYSICAL MEDICINE & REHABILITATION | Admitting: PHYSICAL MEDICINE & REHABILITATION
Payer: COMMERCIAL

## 2020-07-08 VITALS
BODY MASS INDEX: 31.44 KG/M2 | OXYGEN SATURATION: 99 % | DIASTOLIC BLOOD PRESSURE: 88 MMHG | WEIGHT: 245 LBS | RESPIRATION RATE: 16 BRPM | SYSTOLIC BLOOD PRESSURE: 107 MMHG | TEMPERATURE: 97.6 F | HEIGHT: 74 IN | HEART RATE: 69 BPM

## 2020-07-08 PROCEDURE — 3209999900 FLUORO FOR SURGICAL PROCEDURES

## 2020-07-08 PROCEDURE — 2709999900 HC NON-CHARGEABLE SUPPLY: Performed by: PHYSICAL MEDICINE & REHABILITATION

## 2020-07-08 PROCEDURE — 2500000003 HC RX 250 WO HCPCS: Performed by: PHYSICAL MEDICINE & REHABILITATION

## 2020-07-08 PROCEDURE — 3610000056 HC PAIN LEVEL 4 BASE (NON-OR): Performed by: PHYSICAL MEDICINE & REHABILITATION

## 2020-07-08 PROCEDURE — 6360000002 HC RX W HCPCS: Performed by: PHYSICAL MEDICINE & REHABILITATION

## 2020-07-08 PROCEDURE — 99152 MOD SED SAME PHYS/QHP 5/>YRS: CPT | Performed by: PHYSICAL MEDICINE & REHABILITATION

## 2020-07-08 RX ORDER — MIDAZOLAM HYDROCHLORIDE 1 MG/ML
INJECTION INTRAMUSCULAR; INTRAVENOUS
Status: COMPLETED | OUTPATIENT
Start: 2020-07-08 | End: 2020-07-08

## 2020-07-08 RX ORDER — BETAMETHASONE SODIUM PHOSPHATE AND BETAMETHASONE ACETATE 3; 3 MG/ML; MG/ML
INJECTION, SUSPENSION INTRA-ARTICULAR; INTRALESIONAL; INTRAMUSCULAR; SOFT TISSUE
Status: COMPLETED | OUTPATIENT
Start: 2020-07-08 | End: 2020-07-08

## 2020-07-08 RX ORDER — LIDOCAINE HYDROCHLORIDE 10 MG/ML
INJECTION, SOLUTION INFILTRATION; PERINEURAL
Status: COMPLETED | OUTPATIENT
Start: 2020-07-08 | End: 2020-07-08

## 2020-07-08 RX ORDER — BUPIVACAINE HYDROCHLORIDE 5 MG/ML
INJECTION, SOLUTION EPIDURAL; INTRACAUDAL
Status: COMPLETED | OUTPATIENT
Start: 2020-07-08 | End: 2020-07-08

## 2020-07-08 RX ORDER — FENTANYL CITRATE 50 UG/ML
INJECTION, SOLUTION INTRAMUSCULAR; INTRAVENOUS
Status: COMPLETED | OUTPATIENT
Start: 2020-07-08 | End: 2020-07-08

## 2020-07-08 ASSESSMENT — PAIN SCALES - GENERAL
PAINLEVEL_OUTOF10: 0
PAINLEVEL_OUTOF10: 0

## 2020-07-08 ASSESSMENT — PAIN - FUNCTIONAL ASSESSMENT: PAIN_FUNCTIONAL_ASSESSMENT: 0-10

## 2020-07-08 NOTE — H&P
he has never smoked. He has never used smokeless tobacco. He reports previous alcohol use. He reports that he does not use drugs. Family History:   Family History   Problem Relation Age of Onset    Sickle Cell Trait Mother     No Known Problems Father        Vitals: Blood pressure 117/85, pulse 66, temperature 97.6 °F (36.4 °C), temperature source Temporal, resp. rate 16, height 6' 2\" (1.88 m), weight 245 lb (111.1 kg), SpO2 100 %. PHYSICAL EXAM:including affected areas  HENT: Airway patent and reviewed  Cardiovascular: Normal rate, regular rhythm, normal heart sounds. Pulmonary/Chest: No wheezes. No rhonchi. No rales. Abdominal: Soft. Bowel sounds are normal. No distension. Extremities: Moves all extremities equally  Cervical and Lumbar Spine: Painful range of motion, no midline tenderness       Diagnosis:Lumbar radiculopathy  M54.16  M51.26  M48.061  M51.36    Plan: Proceed with planned procedure      ASA CLASS:         []   I. Normal, healthy adult           [x]   II.  Mild systemic disease            []   III. Severe systemic disease      Mallampati: Mallampati Class II - (soft palate, fauces & uvula are visible)      Sedation plan:   [x]  Local              []  Minimal                  []  General anesthesia    Patient's condition acceptable for planned procedure/sedation. Post Procedure Plan   Return to same level of care   ______________________     The patient was counseled at length about the risks of claudio Covid-19 in the kalin-operative and post-operative states including the recovery window of their procedure. The patient was made aware that claudio Covid-19 after a surgical procedure may worsen their prognosis for recovering from the virus and lend to a higher morbidity and or mortality risk. The patient was given the options of postponing their procedure. All of the risks, benefits, and alternatives were discussed. The patient does wish to proceed with the procedure.      The risks and benefits as well as alternatives to the procedure have been discussed with the patient and or family. The patient and or next of kin understands and agrees to proceed.     Wilian Mays M.D.

## 2020-12-02 ENCOUNTER — NURSE TRIAGE (OUTPATIENT)
Dept: OTHER | Facility: CLINIC | Age: 60
End: 2020-12-02

## 2020-12-02 ENCOUNTER — TELEPHONE (OUTPATIENT)
Dept: FAMILY MEDICINE CLINIC | Age: 60
End: 2020-12-02

## 2020-12-02 NOTE — TELEPHONE ENCOUNTER
Patient called pre-service center [facility]) [place] with red flag complaint. Brief description of triage: numbness/tingling in foot    Triage indicates for patient to be seen in next three days      Care advice provided, patient verbalizes understanding; denies any other questions or concerns; instructed to call back for any new or worsening symptoms. Writer provided warm transfer to Stacia Byrd at [facility] for appointment scheduling. Attention Provider: Thank you for allowing me to participate in the care of your patient. The patient was connected to triage in response to information provided to the [facility]. Please do not respond through this encounter as the response is not directed to a shared pool. Reason for Disposition   Numbness or tingling in one or both feet is a chronic symptom (recurrent or ongoing problem lasting > 4 weeks)    Answer Assessment - Initial Assessment Questions  1. SYMPTOM: \"What is the main symptom you are concerned about? \" (e.g., weakness, numbness)      Numbness and tingling in right foot    2. ONSET: \"When did this start? \" (minutes, hours, days; while sleeping)      For several months    3. LAST NORMAL: \"When was the last time you were normal (no symptoms)? \"      It's been close to a year now    4. PATTERN \"Does this come and go, or has it been constant since it started? \"  \"Is it present now? \"      Intermittent, but interferes with gait    5. CARDIAC SYMPTOMS: \"Have you had any of the following symptoms: chest pain, difficulty breathing, palpitations? \"      No    6. NEUROLOGIC SYMPTOMS: \"Have you had any of the following symptoms: headache, dizziness, vision loss, double vision, changes in speech, unsteady on your feet? \"      No    7. OTHER SYMPTOMS: \"Do you have any other symptoms? \"      No    8. PREGNANCY: \"Is there any chance you are pregnant? \" \"When was your last menstrual period? \"      N/A    Protocols used: NEUROLOGIC DEFICIT-ADULT-[place]

## 2020-12-02 NOTE — TELEPHONE ENCOUNTER
Pt sent home from work for chills. He is going to be tested for Covid, but wants to see Dr Alana Rajan for a VV as soon as possible.   Please advise    Last OV 3-2-20

## 2020-12-02 NOTE — TELEPHONE ENCOUNTER
Pt informed. Pt states he does not feel bad enough to go to urgent care. Would like to set up VV next week to discuss numbness at the top of R foot and tightness in R ankle. Ok per Dr. Vikash Vela. Pt scheduled.   Close Encounter

## 2020-12-07 ENCOUNTER — NURSE TRIAGE (OUTPATIENT)
Dept: OTHER | Facility: CLINIC | Age: 60
End: 2020-12-07

## 2020-12-07 NOTE — TELEPHONE ENCOUNTER
[facility] with red flag complaint. Brief description of triage: pt has loss of appetite r/t covid. Cancelled VV today with PCP and wants to reschedule     Triage indicates for patient to     Care advice provided, patient verbalizes understanding; denies any other questions or concerns; instructed to call back for any new or worsening symptoms. [facility] scheduling. Attention Provider: Thank you for allowing me to participate in the care of your patient. The patient was connected to triage in response to information provided to the [facility]. Please do not respond through this encounter as the response is not directed to a shared pool.

## 2020-12-08 ENCOUNTER — TELEPHONE (OUTPATIENT)
Dept: FAMILY MEDICINE CLINIC | Age: 60
End: 2020-12-08

## 2020-12-08 NOTE — TELEPHONE ENCOUNTER
----- Message from Alba Calderon sent at 12/7/2020  5:10 PM EST -----  Subject: Appointment Request    Reason for Call: Urgent Return from RN Triage    QUESTIONS  Type of Appointment? Established Patient  Reason for appointment request? No appointments available during search  Additional Information for Provider? Pt needs virtual visit pt has harry Willams had no availability pt wants call back   ---------------------------------------------------------------------------  --------------  [address]  What is the best way for the office to contact you? OK to leave message on   voicemail  Preferred Call Back Phone Number? [phone]  ---------------------------------------------------------------------------  --------------  SCRIPT ANSWERS  Patient needs to be seen today or tomorrow? Yes  Nurse Name? anne marie  (Did RN indicate the need for Red Scheduling?)?  Yes

## 2020-12-10 ENCOUNTER — TELEMEDICINE (OUTPATIENT)
Dept: FAMILY MEDICINE CLINIC | Age: 60
End: 2020-12-10
Payer: COMMERCIAL

## 2020-12-10 PROCEDURE — 99214 OFFICE O/P EST MOD 30 MIN: CPT | Performed by: FAMILY MEDICINE

## 2020-12-10 RX ORDER — ATORVASTATIN CALCIUM 20 MG/1
20 TABLET, FILM COATED ORAL DAILY
Qty: 30 TABLET | Refills: 2 | Status: SHIPPED | OUTPATIENT
Start: 2020-12-10 | End: 2020-12-16

## 2020-12-10 RX ORDER — GUAIFENESIN 600 MG/1
1200 TABLET, EXTENDED RELEASE ORAL 2 TIMES DAILY
Qty: 28 TABLET | Refills: 1 | Status: SHIPPED | OUTPATIENT
Start: 2020-12-10 | End: 2020-12-16

## 2020-12-10 RX ORDER — ONDANSETRON 4 MG/1
4 TABLET, FILM COATED ORAL EVERY 8 HOURS PRN
Qty: 21 TABLET | Refills: 0 | Status: SHIPPED | OUTPATIENT
Start: 2020-12-10 | End: 2020-12-16

## 2020-12-10 ASSESSMENT — ENCOUNTER SYMPTOMS
NAUSEA: 0
BLOOD IN STOOL: 0
WHEEZING: 0
PHOTOPHOBIA: 0
ANAL BLEEDING: 0
EYE DISCHARGE: 0
CONSTIPATION: 0
COUGH: 0
APNEA: 0
EYE ITCHING: 0
ABDOMINAL PAIN: 0
ABDOMINAL DISTENTION: 0
EYE PAIN: 0
VOMITING: 0
SINUS PAIN: 1
CHOKING: 0
RECTAL PAIN: 0
DIARRHEA: 0
FACIAL SWELLING: 0
STRIDOR: 0
SINUS PRESSURE: 1
SHORTNESS OF BREATH: 0
RHINORRHEA: 1
CHEST TIGHTNESS: 0
SORE THROAT: 0
TROUBLE SWALLOWING: 0
VOICE CHANGE: 0
EYE REDNESS: 0

## 2020-12-10 NOTE — PROGRESS NOTES
Subjective:      Patient ID: Kindra Arellano is a 62 y.o. male. HPI  Patient is  being evaluated by a Virtual Visit (video visit) encounter to address concerns as mentioned above. A caregiver was present when appropriate. Due to this being a TeleHealth encounter (During TROSS-28 public health emergency), evaluation of the following organ systems was limited: Vitals/Constitutional/EENT/Resp/CV/GI//MS/Neuro/Skin/Heme-Lymph-Imm. Pursuant to the emergency declaration under the [address] [place] [address], [address] [place] [address] authority and the [employer] Act, this Virtual Visit was conducted with patient's (and/or legal guardian's) consent, to reduce the patient's risk of exposure to COVID-19 and provide necessary medical care. The patient (and/or legal guardian) has also been advised to contact this office for worsening conditions or problems, and seek emergency medical treatment and/or call 911 if deemed necessary. Services were provided through a video synchronous discussion virtually to substitute for in-person clinic visit. Patient and provider were located at their individual homes. Video visit today conducted via [employer]. COVID + on 12/3 sx: loss of appetite/chills/sweats/fever/cough x 1 week     New problem:COVID-19 concern:+COVID-19 testing on 12/3/20:tested at work([employer]). Exposed at work per pt'. Presenting w/mild dry occasional cough x1week. Associated w/mild occasional clear drainage/fever(tmax 99.4)/chills/sweats;decreased appetite/mild nausea. Improves w/otc robiutssin & tylenol. Worsens with nothing reported. Sick contacts:none recalled. No COVID-19 exposure. No fever reducing meds today. Loss of taste or smell:yes. Denies neuro deficits/rash/neck pain-stiffness-swelling/photophobia/myalgias/dizziness. Denies rash/syncope/pre-syncope/RAMOS. GERD f/u:is doing well. Taking medication without side effects. No other associated/worsening or other improving factors. Denies abdo pain/n-v/diarrhea/melena-blood in stool. Hyperlipidemia:is doing well. Associated w/nothing. 2/8/20 labs not at goal. Labs due. Improving factors:diet regime/med. Worsening factors:none reported. Denies adbo pain/myalgias. No Known Allergies    Current Outpatient Medications on File Prior to Visit   Medication Sig Dispense Refill    atorvastatin (LIPITOR) 20 MG tablet Take 1 tablet by mouth daily 30 tablet 2    Cholecalciferol (VITAMIN D3) 25 MCG (1000 UT) TABS Take 4 tablets by mouth daily 30 tablet 2    omeprazole (PRILOSEC) 40 MG delayed release capsule Take 40 mg by mouth daily      IRON PO Take by mouth daily      diclofenac (VOLTAREN) 75 MG EC tablet Take 75 mg by mouth 2 times daily       No current facility-administered medications on file prior to visit. Past Medical History:   Diagnosis Date    Arthritis     Ganglion cyst     GERD (gastroesophageal reflux disease)     History of prostate cancer 2018    s/p tx: Under care of urology.  Hyperlipidemia     S/P colonoscopy 01/2016    per pt' next in [id number]:see scanned report    Vitamin D deficiency            Social History     Tobacco Use    Smoking status: Never Smoker    Smokeless tobacco: Never Used   Substance Use Topics    Alcohol use: Not Currently    Drug use: Never     Social History     Substance and Sexual Activity   Drug Use Never           Review of Systems   Constitutional: Negative for activity change, appetite change, chills, diaphoresis, fatigue, fever and unexpected weight change. HENT: Positive for congestion, postnasal drip, rhinorrhea, sinus pressure, sinus pain and sneezing. Negative for dental problem, drooling, ear discharge, ear pain, facial swelling, hearing loss, mouth sores, nosebleeds, sore throat, tinnitus, trouble swallowing and voice change.     Eyes: Negative for photophobia, pain, discharge, redness, itching and visual disturbance. Respiratory: Negative for apnea, cough, choking, chest tightness, shortness of breath, wheezing and stridor. Cardiovascular: Negative for chest pain, palpitations and leg swelling. Gastrointestinal: Negative for abdominal distention, abdominal pain, anal bleeding, blood in stool, constipation, diarrhea, nausea, rectal pain and vomiting. Endocrine: Negative. Skin: Negative for rash and wound. Neurological: Negative for dizziness, light-headedness and headaches. Hematological: Negative for adenopathy. Objective:RR=16   Physical Exam  Constitutional:       Appearance: He is well-developed. He is not toxic-appearing. Comments: Note:exam was conducted with pt' either self-palpating or visually indicating via their device camera. HENT:      Head:      Comments: Mild audible nasal congestion. Mouth/Throat:      Mouth: Mucous membranes are moist.      Pharynx: Oropharynx is clear. Uvula midline. Eyes:      General: No scleral icterus. Conjunctiva/sclera: Conjunctivae normal.      Pupils: Pupils are equal, round, and reactive to light. Neck:      Comments: No neck LAD per self-palpation. Pulmonary:      Effort: Pulmonary effort is normal.      Comments: No audible wheezing/cough/sob. Abdominal:      Comments: Abdo exam:S,Non-tender per pt' self-palpation. Skin:     Coloration: Skin is not cyanotic. Neurological:      Mental Status: He is alert. Psychiatric:         Mood and Affect: Mood normal.         Behavior: Behavior is cooperative. Comments: Good eye contact. Assessment:               Diagnosis Orders   1. COVID-19  VSS per limited vitals obtainable via virtual visit(VV)/well appearing. Quarantine advised for 14days from 12/3/20:will recheck in 1week. Supportive tx since this is a virus & a antibiotic will not tx this:pt' aware. Advil/tylenol/fluids/rest.  Zofram prn nausea. Mucinex prn cough.   CXR if not improving cough.    XR CHEST (2 VW)    guaiFENesin (MUCINEX) 600 MG extended release tablet   2. Cough  XR CHEST (2 VW)    guaiFENesin (MUCINEX) 600 MG extended release tablet   3. Mixed hyperlipidemia  Labs due. atorvastatin (LIPITOR) 20 MG tablet   4. Nausea  ondansetron (ZOFRAN) 4 MG tablet       Plan:        Pt' ended call in good condition. Call or return to clinic prn if these symptoms worsen or fail to improve as anticipated. Advised to go to local ER or call 911 for any worrisome signs/sx including but not limited to worsening of current complaint or development of resp distress/dysphagia/odynophagia/sob/dizziness/appetite loss/high fever/rash.         João Araiza MD

## 2020-12-16 ENCOUNTER — TELEMEDICINE (OUTPATIENT)
Dept: FAMILY MEDICINE CLINIC | Age: 60
End: 2020-12-16
Payer: COMMERCIAL

## 2020-12-16 PROCEDURE — 99213 OFFICE O/P EST LOW 20 MIN: CPT | Performed by: FAMILY MEDICINE

## 2020-12-16 ASSESSMENT — ENCOUNTER SYMPTOMS
RECTAL PAIN: 0
EYE PAIN: 0
COUGH: 0
ABDOMINAL DISTENTION: 0
PHOTOPHOBIA: 0
SHORTNESS OF BREATH: 0
CHOKING: 0
DIARRHEA: 0
EYE REDNESS: 0
BLOOD IN STOOL: 0
FACIAL SWELLING: 0
VOMITING: 0
RHINORRHEA: 0
ANAL BLEEDING: 0
SINUS PAIN: 0
ABDOMINAL PAIN: 0
SINUS PRESSURE: 0
EYE DISCHARGE: 0
CHEST TIGHTNESS: 0
SORE THROAT: 0
NAUSEA: 0
EYE ITCHING: 0
CONSTIPATION: 0
STRIDOR: 0
VOICE CHANGE: 0
APNEA: 0
TROUBLE SWALLOWING: 0
WHEEZING: 0

## 2020-12-16 NOTE — PROGRESS NOTES
Subjective:      Patient ID: Eugenio Schuler is a 56 y.o. male. HPI    Patient is  being evaluated by a Virtual Visit (video visit) encounter to address concerns as mentioned above. A caregiver was present when appropriate. Due to this being a TeleHealth encounter (During PFQXT-49 public health emergency), evaluation of the following organ systems was limited: Vitals/Constitutional/EENT/Resp/CV/GI//MS/Neuro/Skin/Heme-Lymph-Imm. Pursuant to the emergency declaration under the [facility], [address] [place] [address] authority and the [employer] Act, this Virtual Visit was conducted with patient's (and/or legal guardian's) consent, to reduce the patient's risk of exposure to COVID-19 and provide necessary medical care. The patient (and/or legal guardian) has also been advised to contact this office for worsening conditions or problems, and seek emergency medical treatment and/or call 911 if deemed necessary. Services were provided through a video synchronous discussion virtually to substitute for in-person clinic visit. Patient and provider were located at their individual homes. Video visit today conducted via iQVCloud. F/u to:COVID-19 concern:+COVID-19 testing on 12/3/20:tested at work([employer]). Is feeling better but has some very mild weakness that is improving & almost better:pt' request few more days to fully recover & would like to return next Monday to work. Will have retesting done since [address] [place] [address] requires this. Mild dry occasional cough is better. Associated w/mild occasional clear drainage/fever(tmax 99.4)/chills/sweats;decreased appetite/mild nausea:these are better. Improved now. . No fever reducing meds this week. Worsens with nothing reported. Sick contacts:none recalled. No repeat COVID-19 exposure. No fever reducing meds today. Loss of taste or smell:is better. Denies neuro deficits/rash/neck pain-stiffness-swelling/photophobia/myalgias/dizziness. Denies rash/syncope/pre-syncope/HA. [address] [place] [address] [address] well. Takes medication without side effects. No other associated/worsening or other improving factors. Denies abdo pain/n-v/diarrhea/melena-blood in stool. No Known Allergies    Current Outpatient Medications on File Prior to Visit   Medication Sig Dispense Refill    guaiFENesin (MUCINEX) 600 MG extended release tablet Take 2 tablets by mouth 2 times daily 28 tablet 1    atorvastatin (LIPITOR) 20 MG tablet Take 1 tablet by mouth daily 30 tablet 2    ondansetron (ZOFRAN) 4 MG tablet Take 1 tablet by mouth every 8 hours as needed for Nausea or Vomiting 21 tablet 0     No current facility-administered medications on file prior to visit. Past Medical History:   Diagnosis Date    Arthritis     Ganglion cyst     GERD (gastroesophageal reflux disease)     History of prostate cancer 2018    s/p tx: Under care of urology.  Hyperlipidemia     S/P colonoscopy 01/2016    per pt' next in [id number]:see scanned report    Vitamin D deficiency            Social History     Tobacco Use    Smoking status: Never Smoker    Smokeless tobacco: Never Used   Substance Use Topics    Alcohol use: Not Currently    Drug use: Never     Social History     Substance and Sexual Activity   Drug Use Never           Review of Systems   Constitutional: Negative for activity change, appetite change, chills, diaphoresis, fatigue, fever and unexpected weight change. HENT: Negative for congestion, dental problem, drooling, ear discharge, ear pain, facial swelling, hearing loss, mouth sores, nosebleeds, postnasal drip, rhinorrhea, sinus pressure, sinus pain, sneezing, sore throat, tinnitus, trouble swallowing and voice change. Eyes: Negative for photophobia, pain, discharge, redness, itching and visual disturbance. Respiratory: Negative for apnea, cough, choking, chest tightness, shortness of breath, wheezing and stridor. Cardiovascular: Negative for chest pain, palpitations and leg swelling. Gastrointestinal: Negative for abdominal distention, abdominal pain, anal bleeding, blood in stool, constipation, diarrhea, nausea, rectal pain and vomiting. Skin: Negative for rash and wound. Neurological: Negative for dizziness, light-headedness and headaches. Hematological: Negative for adenopathy. Objective:RR=17   Physical Exam  Constitutional:       Appearance: He is well-developed. He is not toxic-appearing. Comments: Note:exam was conducted with pt' either self-palpating or visually indicating via their device camera. HENT:      Head:      Comments: No audible nasal congestion. Mouth/Throat:      Mouth: Mucous membranes are moist.      Pharynx: Oropharynx is clear. Uvula midline. Eyes:      General: No scleral icterus. Conjunctiva/sclera: Conjunctivae normal.      Pupils: Pupils are equal, round, and reactive to light. Neck:      Comments: No neck LAD per self-palpation. Pulmonary:      Effort: Pulmonary effort is normal.      Comments: No audible wheezing/cough/sob. Abdominal:      Comments: Abdo exam:S,Non-tender per pt' self-palpation today. Skin:     Coloration: Skin is not cyanotic. Neurological:      Mental Status: He is alert. Psychiatric:      Comments: Good eye contact. Assessment:         Diagnosis Orders   1. COVID-19  VSS per limited vitals obtainable via virtual visit(VV)/well appearing. Clinically improved with minimal weakness but is well improving. Retest per work ok to do this week. Ok to return to work on Monday 12/21. Ok to return to work on Monday   2. Cough  Improved. 3. Nausea  Improved. 4. GERD:stable. Plan:        Pt' ended call in good condition.

## 2021-02-02 ENCOUNTER — NURSE TRIAGE (OUTPATIENT)
Dept: OTHER | Facility: CLINIC | Age: 61
End: 2021-02-02

## 2021-02-02 ENCOUNTER — TELEPHONE (OUTPATIENT)
Dept: FAMILY MEDICINE CLINIC | Age: 61
End: 2021-02-02

## 2021-02-02 NOTE — TELEPHONE ENCOUNTER
Reason for Disposition   Swollen foot (Exceptions: localized bump from bunions, calluses, insect bite, sting)    Answer Assessment - Initial Assessment Questions  1. ONSET: \"When did the pain start? \"       A few days ago    2. LOCATION: \"Where is the pain located? \"       L heel    3. PAIN: \"How bad is the pain? \"    (Scale 1-10; or mild, moderate, severe)    -  MILD (1-3): doesn't interfere with normal activities     -  MODERATE (4-7): interferes with normal activities (e.g., work or school) or awakens from sleep, limping     -  SEVERE (8-10): excruciating pain, unable to do any normal activities, unable to walk      4/10    4. WORK OR EXERCISE: \"Has there been any recent work or exercise that involved this part of the body? \"       Denies    5. CAUSE: \"What do you think is causing the foot pain? \"      Unsure    6. OTHER SYMPTOMS: \"Do you have any other symptoms? \" (e.g., leg pain, rash, fever, numbness)      Swelling to the back of the heel; numbness to the side of the L foot on occasion    7. PREGNANCY: \"Is there any chance you are pregnant? \" \"When was your last menstrual period? \"      NA    Protocols used: FOOT PAIN-ADULT-[place]    [facility]  with red flag complaint. Brief description of triage: See above    Triage indicates for patient to be seen in the office today. If no available appts, advised walk-in clinic or UCC; pt v/u. Care advice provided, patient verbalizes understanding; denies any other questions or concerns; instructed to call back for any new or worsening symptoms. Writer provided warm transfer to [facility] for appointment scheduling. Attention Provider: Thank you for allowing me to participate in the care of your patient. The patient was connected to triage in response to information provided to the [facility]. Please do not respond through this encounter as the response is not directed to a shared pool.

## 2021-02-02 NOTE — TELEPHONE ENCOUNTER
Eval today at local urgent care advised:no appt available with me. F/u with me via Vv after discharge from that facility.

## 2021-03-30 ENCOUNTER — TELEPHONE (OUTPATIENT)
Dept: INTERNAL MEDICINE CLINIC | Age: 61
End: 2021-03-30

## 2021-03-30 NOTE — TELEPHONE ENCOUNTER
----- Message from Deo Chavez sent at 3/30/2021 11:38 AM EDT -----  Subject: Message to Provider    QUESTIONS  Information for Provider? Pt establishing with Dr. Lexie Vance on 7/9. Pt   wondering if he can get in sooner or if someone can call him if there is a   cancellation to get him in earlier  ---------------------------------------------------------------------------  --------------  [address]  What is the best way for the office to contact you? OK to leave message on   voicemail  Preferred Call Back Phone Number? [phone]  ---------------------------------------------------------------------------  --------------  SCRIPT ANSWERS  Relationship to Patient?  Self

## 2021-05-04 ENCOUNTER — TELEPHONE (OUTPATIENT)
Dept: FAMILY MEDICINE CLINIC | Age: 61
End: 2021-05-04

## 2021-05-04 NOTE — TELEPHONE ENCOUNTER
----- Message from Brianna Stearns sent at 5/4/2021  9:25 AM EDT -----  Subject: Appointment Request    Reason for Call: Routine Pre-Op    QUESTIONS  Type of Appointment? Established Patient  Reason for appointment request? No appointments available during search  Additional Information for Provider? Patient has surgery June 23 and needs   a pre-op done before then . Dr. Krystal Egan schedule is completely full , please   contact patient .   ---------------------------------------------------------------------------  --------------  CALL BACK INFO  What is the best way for the office to contact you? OK to leave message on   voicemail, OK to respond with electronic message via Seeo portal (only   for patients who have registered Seeo account)  Preferred Call Back Phone Number? [phone]  ---------------------------------------------------------------------------  --------------  SCRIPT ANSWERS  Relationship to Patient? Self  Appointment reason? Symptomatic  Select script based on patient symptoms? Adult Pre-Op   Do you have question for your provider that need to be answered prior to   scheduling your pre-op appointment? No  Have you been diagnosed with, tested for, or told that you are suspected   of having COVID-19 (Coronavirus)? No  Have you had a fever or taken medication to treat a fever within the past   3 days? No  Have you had a cough, shortness of breath or flu-like symptoms within the   past 3 days? No  Do you currently have flu-like symptoms including fever or chills, cough,   shortness of breath, or difficulty breathing, or new loss of taste or   smell? No  (Service Expert  click yes below to proceed with [employer] As Usual   Scheduling)?  Yes

## 2021-05-04 NOTE — TELEPHONE ENCOUNTER
Ok to schedule via VV 30days prior to surgery. Verify date of surgery. If he is within 30days of surgery then ok to schedule this afternoon.

## 2021-06-02 ENCOUNTER — OFFICE VISIT (OUTPATIENT)
Dept: INTERNAL MEDICINE CLINIC | Age: 61
End: 2021-06-02
Payer: COMMERCIAL

## 2021-06-02 VITALS
BODY MASS INDEX: 32.61 KG/M2 | DIASTOLIC BLOOD PRESSURE: 86 MMHG | HEART RATE: 67 BPM | OXYGEN SATURATION: 97 % | WEIGHT: 254 LBS | SYSTOLIC BLOOD PRESSURE: 113 MMHG

## 2021-06-02 DIAGNOSIS — E78.5 HYPERLIPIDEMIA, UNSPECIFIED HYPERLIPIDEMIA TYPE: ICD-10-CM

## 2021-06-02 DIAGNOSIS — E55.9 VITAMIN D DEFICIENCY: ICD-10-CM

## 2021-06-02 DIAGNOSIS — E78.2 MIXED HYPERLIPIDEMIA: ICD-10-CM

## 2021-06-02 DIAGNOSIS — Z00.00 ROUTINE GENERAL MEDICAL EXAMINATION AT A HEALTH CARE FACILITY: Primary | ICD-10-CM

## 2021-06-02 DIAGNOSIS — M67.88 ACHILLES TENDINOSIS OF LEFT LOWER EXTREMITY: ICD-10-CM

## 2021-06-02 DIAGNOSIS — Z01.818 PREOP EXAMINATION: ICD-10-CM

## 2021-06-02 PROBLEM — E66.9 OBESITY, CLASS I, BMI 30-34.9: Status: ACTIVE | Noted: 2021-06-02

## 2021-06-02 PROBLEM — Z85.46 PERSONAL HISTORY OF PROSTATE CANCER: Status: ACTIVE | Noted: 2018-10-17

## 2021-06-02 PROBLEM — N52.34 ERECTILE DYSFUNCTION FOLLOWING SIMPLE PROSTATECTOMY: Status: ACTIVE | Noted: 2018-10-17

## 2021-06-02 PROCEDURE — 99244 OFF/OP CNSLTJ NEW/EST MOD 40: CPT | Performed by: NURSE PRACTITIONER

## 2021-06-02 RX ORDER — TADALAFIL 5 MG/1
TABLET ORAL
COMMUNITY
Start: 2021-04-21

## 2021-06-02 SDOH — ECONOMIC STABILITY: TRANSPORTATION INSECURITY
IN THE PAST 12 MONTHS, HAS THE LACK OF TRANSPORTATION KEPT YOU FROM MEDICAL APPOINTMENTS OR FROM GETTING MEDICATIONS?: NO

## 2021-06-02 SDOH — ECONOMIC STABILITY: FOOD INSECURITY: WITHIN THE PAST 12 MONTHS, THE FOOD YOU BOUGHT JUST DIDN'T LAST AND YOU DIDN'T HAVE MONEY TO GET MORE.: NEVER TRUE

## 2021-06-02 SDOH — ECONOMIC STABILITY: FOOD INSECURITY: WITHIN THE PAST 12 MONTHS, YOU WORRIED THAT YOUR FOOD WOULD RUN OUT BEFORE YOU GOT MONEY TO BUY MORE.: NEVER TRUE

## 2021-06-02 SDOH — ECONOMIC STABILITY: TRANSPORTATION INSECURITY
IN THE PAST 12 MONTHS, HAS LACK OF TRANSPORTATION KEPT YOU FROM MEETINGS, WORK, OR FROM GETTING THINGS NEEDED FOR DAILY LIVING?: NO

## 2021-06-02 ASSESSMENT — ENCOUNTER SYMPTOMS
ABDOMINAL PAIN: 0
COLOR CHANGE: 0
COUGH: 0
SINUS PRESSURE: 0
CONSTIPATION: 0
SHORTNESS OF BREATH: 0
WHEEZING: 0
BACK PAIN: 0
SINUS PAIN: 0
DIARRHEA: 0

## 2021-06-02 ASSESSMENT — PATIENT HEALTH QUESTIONNAIRE - PHQ9
SUM OF ALL RESPONSES TO PHQ QUESTIONS 1-9: 0
SUM OF ALL RESPONSES TO PHQ9 QUESTIONS 1 & 2: 0
1. LITTLE INTEREST OR PLEASURE IN DOING THINGS: 0
2. FEELING DOWN, DEPRESSED OR HOPELESS: 0
SUM OF ALL RESPONSES TO PHQ QUESTIONS 1-9: 0
SUM OF ALL RESPONSES TO PHQ QUESTIONS 1-9: 0

## 2021-06-02 ASSESSMENT — SOCIAL DETERMINANTS OF HEALTH (SDOH): HOW HARD IS IT FOR YOU TO PAY FOR THE VERY BASICS LIKE FOOD, HOUSING, MEDICAL CARE, AND HEATING?: NOT HARD AT ALL

## 2021-06-02 NOTE — ASSESSMENT & PLAN NOTE
Perioperative risk related to the patient's upcoming surgery is considered low. he is cleared for surgery.   Pre-op exam was completed on 6/2/21 2:48 PM.   - check BMP, CBC   - not on any medications, no change in regimen

## 2021-06-02 NOTE — PROGRESS NOTES
Concern    Not on file   Social History Narrative    Not on file     Social Determinants of Health     Financial Resource Strain: Low Risk     Difficulty of Paying Living Expenses: Not hard at all   Food Insecurity: No Food Insecurity    Worried About Running Out of Food in the Last Year: Never true    [address] in the Last Year: Never true   Transportation Needs: No Transportation Needs    Lack of Transportation (Medical): No    Lack of Transportation (Non-Medical): No   Physical Activity:     Days of Exercise per Week:     Minutes of Exercise per Session:    Stress:     Feeling of Stress :    Social Connections:     Frequency of Communication with Friends and Family:     Frequency of Social Gatherings with Friends and Family:     Attends [employer] Services:     Active Member of Clubs or Organizations:     Attends Club or Organization Meetings:     Marital Status:    Intimate Partner Violence:     Fear of Current or Ex-Partner:     Emotionally Abused:     Physically Abused:     Sexually Abused:        Review of Systems  Review of Systems   Constitutional: Negative for chills, fatigue and fever. HENT: Negative for congestion, sinus pressure and sinus pain. Respiratory: Negative for cough, shortness of breath and wheezing. Cardiovascular: Negative for chest pain and palpitations. Gastrointestinal: Negative for abdominal pain, constipation and diarrhea. Musculoskeletal: Positive for arthralgias (left lower extremitiy ). Negative for back pain and myalgias. Skin: Negative for color change, pallor and rash. Neurological: Negative for dizziness, syncope, weakness, light-headedness and headaches. Psychiatric/Behavioral: Negative for behavioral problems, confusion and sleep disturbance. The patient is not nervous/anxious.            Objective:     Vitals:    06/02/21 0952   BP: 113/86   Pulse: 67   SpO2: 97%        Physical Exam  Physical Exam  Constitutional:       Appearance: He is well-developed. HENT:      Head: Normocephalic and atraumatic. Eyes:      Conjunctiva/sclera: Conjunctivae normal.      Pupils: Pupils are equal, round, and reactive to light. Neck:      Thyroid: No thyromegaly. Vascular: No JVD. Cardiovascular:      Rate and Rhythm: Normal rate and regular rhythm. Heart sounds: Normal heart sounds. Pulmonary:      Effort: Pulmonary effort is normal. No respiratory distress. Breath sounds: Normal breath sounds. No wheezing. Musculoskeletal:         General: No deformity. Normal range of motion. Cervical back: Normal range of motion and neck supple. Skin:     General: Skin is warm and dry. Capillary Refill: Capillary refill takes less than 2 seconds. Neurological:      Mental Status: He is alert and oriented to person, place, and time. Psychiatric:         Behavior: Behavior normal.         Lab Review   Lab Results   Component Value Date     02/08/2020    K 4.8 02/08/2020    K 4.3 08/17/2019     02/08/2020    CO2 23 02/08/2020    BUN 16 02/08/2020    CREATININE 1.08 02/08/2020    GLUCOSE 85 02/08/2020    CALCIUM 9.2 02/08/2020     Lab Results   Component Value Date    WBC 8.5 08/17/2019    HGB 13.3 08/17/2019    HCT 39.7 08/17/2019    MCV 78.3 08/17/2019     08/17/2019         Medication Review  Current Outpatient Medications on File Prior to Visit   Medication Sig Dispense Refill    tadalafil (CIALIS) 5 MG tablet TAKE 1 TABLET BY MOUTH ONCE DAILY       No current facility-administered medications on file prior to visit. Assessment:       1. Routine general medical examination at a health care facility    2. Hyperlipidemia, unspecified hyperlipidemia type    3. Vitamin D deficiency    4. Preop examination    5. Mixed hyperlipidemia    6. Achilles tendinosis of left lower extremity           Plan:        Preop examination  Perioperative risk related to the patient's upcoming surgery is considered low.  he is cleared for surgery.   Pre-op exam was completed on 6/2/21 2:48 PM.   - check BMP, CBC   - not on any medications, no change in regimen    Mixed hyperlipidemia  Check labs   Working on diet and exercise     Achilles tendinosis of left lower extremity  Following with ortho   Surgery as planned          - Preoperative workup as follows none  - Change in medication regimen before surgery: N/A, not on any meds  - Prophylaxisfor cardiac events with perioperative beta-blockers: should be considered, specific regimen per anesthesia

## 2021-06-03 ENCOUNTER — OFFICE VISIT (OUTPATIENT)
Dept: INTERNAL MEDICINE CLINIC | Age: 61
End: 2021-06-03
Payer: COMMERCIAL

## 2021-06-03 VITALS
OXYGEN SATURATION: 95 % | HEIGHT: 74 IN | HEART RATE: 67 BPM | BODY MASS INDEX: 32.6 KG/M2 | WEIGHT: 254 LBS | DIASTOLIC BLOOD PRESSURE: 73 MMHG | SYSTOLIC BLOOD PRESSURE: 110 MMHG

## 2021-06-03 DIAGNOSIS — E66.9 OBESITY, CLASS I, BMI 30-34.9: ICD-10-CM

## 2021-06-03 DIAGNOSIS — Z00.00 ROUTINE GENERAL MEDICAL EXAMINATION AT A HEALTH CARE FACILITY: Primary | ICD-10-CM

## 2021-06-03 DIAGNOSIS — N52.34 ERECTILE DYSFUNCTION FOLLOWING SIMPLE PROSTATECTOMY: ICD-10-CM

## 2021-06-03 DIAGNOSIS — Z85.46 PERSONAL HISTORY OF PROSTATE CANCER: ICD-10-CM

## 2021-06-03 DIAGNOSIS — E55.9 VITAMIN D DEFICIENCY: ICD-10-CM

## 2021-06-03 DIAGNOSIS — E78.5 HYPERLIPIDEMIA, UNSPECIFIED HYPERLIPIDEMIA TYPE: ICD-10-CM

## 2021-06-03 DIAGNOSIS — Z00.00 ROUTINE GENERAL MEDICAL EXAMINATION AT A HEALTH CARE FACILITY: ICD-10-CM

## 2021-06-03 DIAGNOSIS — E78.2 MIXED HYPERLIPIDEMIA: ICD-10-CM

## 2021-06-03 PROBLEM — Z01.818 PREOP EXAMINATION: Status: RESOLVED | Noted: 2021-06-02 | Resolved: 2021-06-03

## 2021-06-03 LAB
A/G RATIO: 1.9 (ref 1.1–2.2)
ALBUMIN SERPL-MCNC: 4.5 G/DL (ref 3.4–5)
ALP BLD-CCNC: 46 U/L (ref 40–129)
ALT SERPL-CCNC: 13 U/L (ref 10–40)
ANION GAP SERPL CALCULATED.3IONS-SCNC: 11 MMOL/L (ref 3–16)
AST SERPL-CCNC: 14 U/L (ref 15–37)
BILIRUB SERPL-MCNC: 0.5 MG/DL (ref 0–1)
BUN BLDV-MCNC: 9 MG/DL (ref 7–20)
CALCIUM SERPL-MCNC: 9.5 MG/DL (ref 8.3–10.6)
CHLORIDE BLD-SCNC: 106 MMOL/L (ref 99–110)
CHOLESTEROL, FASTING: 244 MG/DL (ref 0–199)
CO2: 25 MMOL/L (ref 21–32)
CREAT SERPL-MCNC: 1.2 MG/DL (ref 0.8–1.3)
GFR AFRICAN AMERICAN: >60
GFR NON-AFRICAN AMERICAN: >60
GLOBULIN: 2.4 G/DL
GLUCOSE BLD-MCNC: 93 MG/DL (ref 70–99)
HCT VFR BLD CALC: 44.1 % (ref 40.5–52.5)
HDLC SERPL-MCNC: 52 MG/DL (ref 40–60)
HEMOGLOBIN: 14.7 G/DL (ref 13.5–17.5)
LDL CHOLESTEROL CALCULATED: 173 MG/DL
MCH RBC QN AUTO: 26.2 PG (ref 26–34)
MCHC RBC AUTO-ENTMCNC: 33.3 G/DL (ref 31–36)
MCV RBC AUTO: 78.9 FL (ref 80–100)
PDW BLD-RTO: 14.9 % (ref 12.4–15.4)
PLATELET # BLD: 253 K/UL (ref 135–450)
PMV BLD AUTO: 7.4 FL (ref 5–10.5)
POTASSIUM SERPL-SCNC: 5.1 MMOL/L (ref 3.5–5.1)
RBC # BLD: 5.6 M/UL (ref 4.2–5.9)
SODIUM BLD-SCNC: 142 MMOL/L (ref 136–145)
TOTAL PROTEIN: 6.9 G/DL (ref 6.4–8.2)
TRIGLYCERIDE, FASTING: 94 MG/DL (ref 0–150)
TSH REFLEX: 1.9 UIU/ML (ref 0.27–4.2)
VITAMIN D 25-HYDROXY: 21.4 NG/ML
VLDLC SERPL CALC-MCNC: 19 MG/DL
WBC # BLD: 3.6 K/UL (ref 4–11)

## 2021-06-03 PROCEDURE — 90471 IMMUNIZATION ADMIN: CPT | Performed by: NURSE PRACTITIONER

## 2021-06-03 PROCEDURE — 99396 PREV VISIT EST AGE 40-64: CPT | Performed by: NURSE PRACTITIONER

## 2021-06-03 PROCEDURE — 90715 TDAP VACCINE 7 YRS/> IM: CPT | Performed by: NURSE PRACTITIONER

## 2021-06-03 PROCEDURE — 90472 IMMUNIZATION ADMIN EACH ADD: CPT | Performed by: NURSE PRACTITIONER

## 2021-06-03 PROCEDURE — 90750 HZV VACC RECOMBINANT IM: CPT | Performed by: NURSE PRACTITIONER

## 2021-06-03 ASSESSMENT — ENCOUNTER SYMPTOMS
COUGH: 0
SINUS PRESSURE: 0
SHORTNESS OF BREATH: 0
DIARRHEA: 0
COLOR CHANGE: 0
SORE THROAT: 0
NAUSEA: 0
BACK PAIN: 0
CONSTIPATION: 0
ABDOMINAL DISTENTION: 0
BLOOD IN STOOL: 0
VOMITING: 0
ABDOMINAL PAIN: 0
SINUS PAIN: 0
WHEEZING: 0
RHINORRHEA: 0
CHOKING: 0

## 2021-06-03 NOTE — PROGRESS NOTES
Subjective:      Patient ID: James Brown is a 56 y.o. y.o. male. HPI    Yadira Cool is here for a physical.    Feels well overall. Taking any routine medications. He has surgery planned for his left Achilles tendinosis. He has not been able to exercise much because of this. Chief Complaint   Patient presents with    Annual Exam       Vitals:    21 1121   BP: 110/73   Pulse: 67   SpO2: 95%       Wt Readings from Last 3 Encounters:   21 254 lb (115.2 kg)   21 254 lb (115.2 kg)   20 245 lb (111.1 kg)       Past Medical History:   Diagnosis Date    Arthritis     Ganglion cyst     GERD (gastroesophageal reflux disease)     History of prostate cancer 2018    s/p tx: Under care of urology.     Hyperlipidemia     S/P colonoscopy 2016    per pt' next in [id number]:see scanned report    Vitamin D deficiency        Past Surgical History:   Procedure Laterality Date    HERNIA REPAIR  1982    PAIN MANAGEMENT PROCEDURE Right 2020    LEFT L4 AND L5 TRANSFORAMINAL EPIDURAL STEROID INJECTION WITH FLUOROSCOPY (30094, 42812) performed by Roxane Sweeney MD at [address] [place] [address] Avenue Bilateral 2020    BILATERAL L5 TRANSFORAMINAL EPIDURAL STEROID INJECTION WITH FLUOROSCOPY performed by [facility]oxane Sweeney MD at [facility]  2018    prostate cancer surgery    SHOULDER SURGERY Left 2017       Family History   Problem Relation Age of Onset    Sickle Cell Trait Mother     No Known Problems Father        Social History     Tobacco Use    Smoking status: Never Smoker    Smokeless tobacco: Never Used   Vaping Use    Vaping Use: Never used   Substance Use Topics    Alcohol use: Not Currently    Drug use: Never       Immunization History   Administered Date(s) Administered    COVID-19, Pfizer, PF, 30mcg/0.3mL 2021, 2021    Influenza Virus Vaccine 10/15/2016, 10/15/2019    Influenza, Quadv, IM, PF (6 mo and older Fluzone, Flulaval, Fluarix, and 3 yrs and older Afluria) 09/23/2020    Tdap (Boostrix, Adacel) 06/03/2021    Zoster Recombinant (Shingrix) 06/03/2021       Health Maintenance   Topic Date Due    Hepatitis C screen  Never done    HIV screen  Never done    Shingles Vaccine (2 of 2) 07/29/2021    Lipid screen  02/08/2025    Colon cancer screen colonoscopy  01/13/2027    DTaP/Tdap/Td vaccine (2 - Td or Tdap) 06/03/2031    Flu vaccine  Completed    COVID-19 Vaccine  Completed    Hepatitis A vaccine  Aged Out    Hepatitis B vaccine  Aged Out    Hib vaccine  Aged Out    Meningococcal (ACWY) vaccine  Aged Out    Pneumococcal 0-64 years Vaccine  Aged Out       Discussed over the counter medication with patient. [employer] Marian received counseling on the following healthy behaviors: nutrition and exercise    Patient given educational materials on Nutrition and Exercise    Discussed use, benefit, and side effects of prescribed medications. Barriers to medication compliance addressed. Allpatient questions answered. Pt voiced understanding. Medications reviewed and patient understands. Questions answered    Review of Systems   Constitutional: Negative for activity change, appetite change, chills, fatigue, fever and unexpected weight change. HENT: Negative for congestion, ear pain, rhinorrhea, sinus pressure, sinus pain, sneezing, sore throat and tinnitus. Respiratory: Negative for cough, choking, shortness of breath and wheezing. Cardiovascular: Negative for chest pain, palpitations and leg swelling. Gastrointestinal: Negative for abdominal distention, abdominal pain, blood in stool, constipation, diarrhea, nausea and vomiting. Endocrine: Negative for cold intolerance, heat intolerance, polydipsia, polyphagia and polyuria. Genitourinary: Negative for decreased urine volume, difficulty urinating, discharge, dysuria, hematuria and urgency. Musculoskeletal: Positive for arthralgias (left achilles).  Negative for back pain, gait problem and myalgias. Skin: Negative for color change, pallor, rash and wound. Neurological: Negative for dizziness, tremors, syncope, weakness, light-headedness and headaches. Hematological: Negative for adenopathy. Does not bruise/bleed easily. Psychiatric/Behavioral: Negative for agitation, behavioral problems, confusion, dysphoric mood and sleep disturbance. The patient is not nervous/anxious. Objective:   Physical Exam  Constitutional:       Appearance: He is well-developed. HENT:      Head: Normocephalic and atraumatic. Right Ear: Hearing, tympanic membrane and external ear normal.      Left Ear: Hearing, tympanic membrane and external ear normal.      Nose: Nose normal.      Mouth/Throat:      Pharynx: No oropharyngeal exudate. Eyes:      General: Lids are normal.      Conjunctiva/sclera: Conjunctivae normal.      Pupils: Pupils are equal, round, and reactive to light. Neck:      Thyroid: No thyromegaly. Vascular: No JVD. Cardiovascular:      Rate and Rhythm: Normal rate and regular rhythm. Heart sounds: Normal heart sounds. No murmur heard. No friction rub. No gallop. No S3 or S4 sounds. Pulmonary:      Effort: Pulmonary effort is normal. No respiratory distress. Breath sounds: Normal breath sounds. No wheezing. Abdominal:      General: Bowel sounds are normal. There is no distension. Palpations: Abdomen is soft. There is no mass. Tenderness: There is no abdominal tenderness. There is no guarding. Hernia: A hernia is present. Hernia is present in the ventral area. Musculoskeletal:         General: No tenderness or deformity. Cervical back: Normal range of motion and neck supple. Left ankle: Decreased range of motion. Comments: Walking boot in place- left foot   Skin:     General: Skin is warm and dry. Capillary Refill: Capillary refill takes less than 2 seconds. Findings: No erythema or rash.       Comments:

## 2021-06-03 NOTE — ASSESSMENT & PLAN NOTE
Discussed vaccines   Encouraged healthy diet and exercise   Check labs  Reviewed colonoscopy   Tdap and shingrix given

## 2021-06-04 DIAGNOSIS — E78.2 MIXED HYPERLIPIDEMIA: ICD-10-CM

## 2021-06-04 RX ORDER — ATORVASTATIN CALCIUM 20 MG/1
20 TABLET, FILM COATED ORAL DAILY
Qty: 30 TABLET | Refills: 2 | Status: SHIPPED | OUTPATIENT
Start: 2021-06-04

## 2021-06-04 RX ORDER — OMEPRAZOLE 40 MG/1
40 CAPSULE, DELAYED RELEASE ORAL DAILY
Qty: 30 CAPSULE | Refills: 2 | Status: SHIPPED | OUTPATIENT
Start: 2021-06-04 | End: 2021-10-25

## 2021-06-21 NOTE — PROGRESS NOTES
Name_______________________________________Printed:____________________  Date and time of surgery__6/23/21  1015______________________Arrival Time:____0845  [facility] [place]____________   1. The instructions given regarding when and if a patient needs to stop oral intake prior to surgery varies. Follow the specific instructions you were given                  XXXX___Nothing to eat or to drink after Midnight the night before.                   ____Carbo loading or ERAS instructions will be given to select patients-if you have been given those instructions -please do the following                           The evening before your surgery after dinner before midnight drink 40 ounces of gatorade. If you are diabetic use sugar free. The morning of surgery drink 40 ounces of water. This needs to be finished 3 hours prior to your surgery start time. 2. Take the following pills with a small sip of water on the morning of surgery__________none_________________________________________                  Do not take blood pressure medications ending in pril or sartan the sherlyn prior to surgery or the morning of surgery_   3. Aspirin, Ibuprofen, Advil, Naproxen, Vitamin E and other Anti-inflammatory products and supplements should be stopped for 5 -7days before surgery or as directed by your physician. 4. Check with your Doctor regarding stopping Plavix, Coumadin,Eliquis, Lovenox,Effient,Pradaxa,Xarelto, Fragmin or other blood thinners and follow their instructions. 5. Do not smoke, and do not drink any alcoholic beverages 24 hours prior to surgery. This includes NA Beer. Refrain from the usage of any recreational drugs. 6. You may brush your teeth and gargle the morning of surgery. DO NOT SWALLOW WATER   7. You MUST make arrangements for a responsible adult to stay on site while you are here and take you home after your surgery. You will not be allowed to leave alone or drive yourself home.   It is strongly suggested someone stay with you the first 24 hrs. Your surgery will be cancelled if you do not have a ride home. 8. A parent/legal guardian must accompany a child scheduled for surgery and plan to stay at the hospital until the child is discharged. Please do not bring other children with you. 9. Please wear simple, loose fitting clothing to the hospital.  Eloina Hazel not bring valuables (money, credit cards, checkbooks, etc.) Do not wear any makeup (including no eye makeup) or nail polish on your fingers or toes. 10. DO NOT wear any jewelry or piercings on day of surgery. All body piercing jewelry must be removed. 11. If you have ___dentures, they will be removed before going to the OR; we will provide you a container. If you wear ___contact lenses or ___glasses, they will be removed; please bring a case for them. 12. Please see your family doctor/pediatrician for a history & physical and/or concerning medications. Bring any test results/reports from your physician's office. PCP__________________Phone___________H&P Appt. Date________             13 If you  have a Living Will and Durable Power of  for Healthcare, please bring in a copy. 15. Notify your Surgeon if you develop any illness between now and surgery  time, cough, cold, fever, sore throat, nausea, vomiting, etc.  Please notify your surgeon if you experience dizziness, shortness of breath or blurred vision between now & the time of your surgery             15. DO NOT shave your operative site 96 hours prior to surgery. For face & neck surgery, men may use an electric razor 48 hours prior to surgery. 16. Shower the night before or morning of surgery using an antibacterial soap or as you have been instructed. 17. To provide excellent care visitors will be limited to one in the room at any given time. 18.  Please bring picture ID and insurance card.              19.  Visit our web site for additional information:  [email or web address]              20.During flu season no children under the age of 15 are permitted in the hospital for the safety of all patients. 21. If you take a long acting insulin in the evening only  take half of your usual  dose the night  before your procedure              22. If you use a c-pap please bring DOS if staying overnight,             23.For your convenience [facility] has a pharmacy on site to fill your prescriptions. 24. If you use oxygen and have a portable tank please bring it  with you the DOS             25. Bring a complete list of all your medications with name and dose include any supplements. 26. Other__________________________________________   *Please call pre admission testing if you any further questions   [facility] [address]    [place]. Riky Larger  [phone]   [facility]    __ Done-Where _____  __ Scheduled ___ Where ___   __ Other __________  _  X_ VACCINATED-instructed to bring card DOS      VISITOR POLICY(subject to change)    There is a one visitor policy at [facility] for all surgeries and endoscopies. Whether the visitor can stay or will be asked to wait in the car will depend on the current policy and if social distancing can be maintained. The policy is subject to change at any time. Please make sure the visitor has a cell phone that is on,charged and able to accept calls, as this may be the way that the staff communicates with them. Pain management is NO VISITOR policyThe patients ride is expected to remain in the car with a cell phone for communication. If the ride is leaving the hospital grounds please make sure they are back in time for pickup. Have the patient inform the staff on arrival what their rides plans are while the patient is in the facility. At the MAIN there is one visitor allowed. Please note that the visitor policy is subject to change. All above information reviewed with patient in person or by phone. Patient verbalizes understanding. All questions and concerns addressed.                                                                                                  Patient/Rep____________________

## 2021-06-22 ENCOUNTER — ANESTHESIA EVENT (OUTPATIENT)
Dept: OPERATING ROOM | Age: 61
End: 2021-06-22
Payer: COMMERCIAL

## 2021-06-23 ENCOUNTER — ANESTHESIA (OUTPATIENT)
Dept: OPERATING ROOM | Age: 61
End: 2021-06-23
Payer: COMMERCIAL

## 2021-06-23 ENCOUNTER — APPOINTMENT (OUTPATIENT)
Dept: GENERAL RADIOLOGY | Age: 61
End: 2021-06-23
Attending: PODIATRIST
Payer: COMMERCIAL

## 2021-06-23 ENCOUNTER — HOSPITAL ENCOUNTER (OUTPATIENT)
Age: 61
Setting detail: OUTPATIENT SURGERY
Discharge: HOME | End: 2021-06-23
Attending: PODIATRIST | Admitting: PODIATRIST
Payer: COMMERCIAL

## 2021-06-23 VITALS
BODY MASS INDEX: 32.47 KG/M2 | DIASTOLIC BLOOD PRESSURE: 90 MMHG | TEMPERATURE: 97 F | HEIGHT: 74 IN | HEART RATE: 77 BPM | SYSTOLIC BLOOD PRESSURE: 112 MMHG | OXYGEN SATURATION: 97 % | RESPIRATION RATE: 13 BRPM | WEIGHT: 253 LBS

## 2021-06-23 VITALS
SYSTOLIC BLOOD PRESSURE: 137 MMHG | TEMPERATURE: 95.7 F | DIASTOLIC BLOOD PRESSURE: 88 MMHG | RESPIRATION RATE: 20 BRPM | OXYGEN SATURATION: 100 %

## 2021-06-23 DIAGNOSIS — G89.18 POST-OPERATIVE PAIN: Primary | ICD-10-CM

## 2021-06-23 LAB
GLUCOSE BLD-MCNC: 102 MG/DL (ref 70–99)
PERFORMED ON: ABNORMAL

## 2021-06-23 PROCEDURE — 2709999900 HC NON-CHARGEABLE SUPPLY: Performed by: PODIATRIST

## 2021-06-23 PROCEDURE — 3600000004 HC SURGERY LEVEL 4 BASE: Performed by: PODIATRIST

## 2021-06-23 PROCEDURE — 7100000010 HC PHASE II RECOVERY - FIRST 15 MIN: Performed by: PODIATRIST

## 2021-06-23 PROCEDURE — 3700000001 HC ADD 15 MINUTES (ANESTHESIA): Performed by: PODIATRIST

## 2021-06-23 PROCEDURE — C1713 ANCHOR/SCREW BN/BN,TIS/BN: HCPCS | Performed by: PODIATRIST

## 2021-06-23 PROCEDURE — 2500000003 HC RX 250 WO HCPCS: Performed by: NURSE ANESTHETIST, CERTIFIED REGISTERED

## 2021-06-23 PROCEDURE — 7100000000 HC PACU RECOVERY - FIRST 15 MIN: Performed by: PODIATRIST

## 2021-06-23 PROCEDURE — 6360000002 HC RX W HCPCS: Performed by: ANESTHESIOLOGY

## 2021-06-23 PROCEDURE — 6360000002 HC RX W HCPCS: Performed by: PODIATRIST

## 2021-06-23 PROCEDURE — 3600000014 HC SURGERY LEVEL 4 ADDTL 15MIN: Performed by: PODIATRIST

## 2021-06-23 PROCEDURE — 64447 NJX AA&/STRD FEMORAL NRV IMG: CPT | Performed by: ANESTHESIOLOGY

## 2021-06-23 PROCEDURE — 3700000000 HC ANESTHESIA ATTENDED CARE: Performed by: PODIATRIST

## 2021-06-23 PROCEDURE — 2580000003 HC RX 258: Performed by: PODIATRIST

## 2021-06-23 PROCEDURE — 64445 NJX AA&/STRD SCIATIC NRV IMG: CPT | Performed by: ANESTHESIOLOGY

## 2021-06-23 PROCEDURE — 7100000011 HC PHASE II RECOVERY - ADDTL 15 MIN: Performed by: PODIATRIST

## 2021-06-23 PROCEDURE — 7100000001 HC PACU RECOVERY - ADDTL 15 MIN: Performed by: PODIATRIST

## 2021-06-23 PROCEDURE — 2720000010 HC SURG SUPPLY STERILE: Performed by: PODIATRIST

## 2021-06-23 PROCEDURE — 6360000002 HC RX W HCPCS: Performed by: NURSE ANESTHETIST, CERTIFIED REGISTERED

## 2021-06-23 PROCEDURE — 73630 X-RAY EXAM OF FOOT: CPT

## 2021-06-23 PROCEDURE — 2500000003 HC RX 250 WO HCPCS: Performed by: ANESTHESIOLOGY

## 2021-06-23 DEVICE — SYSTEM FIX LIGMNT AUG REP JUMPSTART DRSG SWIVELOCK: Type: IMPLANTABLE DEVICE | Site: ACHILLES TENDON | Status: FUNCTIONAL

## 2021-06-23 RX ORDER — HYDROCODONE BITARTRATE AND ACETAMINOPHEN 5; 325 MG/1; MG/1
1 TABLET ORAL
Status: DISCONTINUED | OUTPATIENT
Start: 2021-06-23 | End: 2021-06-23 | Stop reason: HOSPADM

## 2021-06-23 RX ORDER — SODIUM CHLORIDE 0.9 % (FLUSH) 0.9 %
10 SYRINGE (ML) INJECTION EVERY 12 HOURS SCHEDULED
Status: DISCONTINUED | OUTPATIENT
Start: 2021-06-23 | End: 2021-06-23 | Stop reason: HOSPADM

## 2021-06-23 RX ORDER — PROPOFOL 10 MG/ML
INJECTION, EMULSION INTRAVENOUS PRN
Status: DISCONTINUED | OUTPATIENT
Start: 2021-06-23 | End: 2021-06-23 | Stop reason: SDUPTHER

## 2021-06-23 RX ORDER — SODIUM CHLORIDE 9 MG/ML
25 INJECTION, SOLUTION INTRAVENOUS PRN
Status: DISCONTINUED | OUTPATIENT
Start: 2021-06-23 | End: 2021-06-23 | Stop reason: HOSPADM

## 2021-06-23 RX ORDER — GLYCOPYRROLATE 1 MG/5 ML
SYRINGE (ML) INTRAVENOUS PRN
Status: DISCONTINUED | OUTPATIENT
Start: 2021-06-23 | End: 2021-06-23 | Stop reason: SDUPTHER

## 2021-06-23 RX ORDER — LABETALOL HYDROCHLORIDE 5 MG/ML
5 INJECTION, SOLUTION INTRAVENOUS EVERY 10 MIN PRN
Status: DISCONTINUED | OUTPATIENT
Start: 2021-06-23 | End: 2021-06-23 | Stop reason: HOSPADM

## 2021-06-23 RX ORDER — FENTANYL CITRATE 50 UG/ML
50 INJECTION, SOLUTION INTRAMUSCULAR; INTRAVENOUS ONCE
Status: COMPLETED | OUTPATIENT
Start: 2021-06-23 | End: 2021-06-23

## 2021-06-23 RX ORDER — SODIUM CHLORIDE, SODIUM LACTATE, POTASSIUM CHLORIDE, CALCIUM CHLORIDE 600; 310; 30; 20 MG/100ML; MG/100ML; MG/100ML; MG/100ML
INJECTION, SOLUTION INTRAVENOUS CONTINUOUS
Status: DISCONTINUED | OUTPATIENT
Start: 2021-06-23 | End: 2021-06-23 | Stop reason: HOSPADM

## 2021-06-23 RX ORDER — ACETAMINOPHEN 650 MG
TABLET, EXTENDED RELEASE ORAL
Status: COMPLETED | OUTPATIENT
Start: 2021-06-23 | End: 2021-06-23

## 2021-06-23 RX ORDER — FENTANYL CITRATE 50 UG/ML
INJECTION, SOLUTION INTRAMUSCULAR; INTRAVENOUS PRN
Status: DISCONTINUED | OUTPATIENT
Start: 2021-06-23 | End: 2021-06-23 | Stop reason: SDUPTHER

## 2021-06-23 RX ORDER — ONDANSETRON 2 MG/ML
4 INJECTION INTRAMUSCULAR; INTRAVENOUS
Status: DISCONTINUED | OUTPATIENT
Start: 2021-06-23 | End: 2021-06-23 | Stop reason: HOSPADM

## 2021-06-23 RX ORDER — BUPIVACAINE HYDROCHLORIDE 5 MG/ML
INJECTION, SOLUTION EPIDURAL; INTRACAUDAL
Status: COMPLETED | OUTPATIENT
Start: 2021-06-23 | End: 2021-06-23

## 2021-06-23 RX ORDER — ONDANSETRON 2 MG/ML
INJECTION INTRAMUSCULAR; INTRAVENOUS PRN
Status: DISCONTINUED | OUTPATIENT
Start: 2021-06-23 | End: 2021-06-23 | Stop reason: SDUPTHER

## 2021-06-23 RX ORDER — LIDOCAINE HYDROCHLORIDE 10 MG/ML
0.5 INJECTION, SOLUTION EPIDURAL; INFILTRATION; INTRACAUDAL; PERINEURAL ONCE
Status: DISCONTINUED | OUTPATIENT
Start: 2021-06-23 | End: 2021-06-23 | Stop reason: HOSPADM

## 2021-06-23 RX ORDER — LIDOCAINE HYDROCHLORIDE 20 MG/ML
INJECTION, SOLUTION EPIDURAL; INFILTRATION; INTRACAUDAL; PERINEURAL PRN
Status: DISCONTINUED | OUTPATIENT
Start: 2021-06-23 | End: 2021-06-23 | Stop reason: SDUPTHER

## 2021-06-23 RX ORDER — MIDAZOLAM HYDROCHLORIDE 2 MG/2ML
1 INJECTION, SOLUTION INTRAMUSCULAR; INTRAVENOUS ONCE
Status: COMPLETED | OUTPATIENT
Start: 2021-06-23 | End: 2021-06-23

## 2021-06-23 RX ORDER — PROMETHAZINE HYDROCHLORIDE 25 MG/1
25 TABLET ORAL EVERY 6 HOURS PRN
Qty: 24 TABLET | Refills: 0 | Status: SHIPPED | OUTPATIENT
Start: 2021-06-23 | End: 2022-03-29

## 2021-06-23 RX ORDER — PROCHLORPERAZINE EDISYLATE 5 MG/ML
5 INJECTION INTRAMUSCULAR; INTRAVENOUS
Status: DISCONTINUED | OUTPATIENT
Start: 2021-06-23 | End: 2021-06-23 | Stop reason: HOSPADM

## 2021-06-23 RX ORDER — DEXAMETHASONE SODIUM PHOSPHATE 4 MG/ML
INJECTION, SOLUTION INTRA-ARTICULAR; INTRALESIONAL; INTRAMUSCULAR; INTRAVENOUS; SOFT TISSUE PRN
Status: DISCONTINUED | OUTPATIENT
Start: 2021-06-23 | End: 2021-06-23 | Stop reason: SDUPTHER

## 2021-06-23 RX ORDER — HYDROMORPHONE HCL 110MG/55ML
0.5 PATIENT CONTROLLED ANALGESIA SYRINGE INTRAVENOUS EVERY 5 MIN PRN
Status: DISCONTINUED | OUTPATIENT
Start: 2021-06-23 | End: 2021-06-23 | Stop reason: HOSPADM

## 2021-06-23 RX ORDER — NEOSTIGMINE METHYLSULFATE 5 MG/5 ML
SYRINGE (ML) INTRAVENOUS PRN
Status: DISCONTINUED | OUTPATIENT
Start: 2021-06-23 | End: 2021-06-23 | Stop reason: SDUPTHER

## 2021-06-23 RX ORDER — FENTANYL CITRATE 50 UG/ML
25 INJECTION, SOLUTION INTRAMUSCULAR; INTRAVENOUS EVERY 5 MIN PRN
Status: DISCONTINUED | OUTPATIENT
Start: 2021-06-23 | End: 2021-06-23 | Stop reason: HOSPADM

## 2021-06-23 RX ORDER — ROCURONIUM BROMIDE 10 MG/ML
INJECTION, SOLUTION INTRAVENOUS PRN
Status: DISCONTINUED | OUTPATIENT
Start: 2021-06-23 | End: 2021-06-23 | Stop reason: SDUPTHER

## 2021-06-23 RX ORDER — LIDOCAINE HYDROCHLORIDE 10 MG/ML
1 INJECTION, SOLUTION EPIDURAL; INFILTRATION; INTRACAUDAL; PERINEURAL
Status: DISCONTINUED | OUTPATIENT
Start: 2021-06-23 | End: 2021-06-23 | Stop reason: HOSPADM

## 2021-06-23 RX ORDER — HYDRALAZINE HYDROCHLORIDE 20 MG/ML
5 INJECTION INTRAMUSCULAR; INTRAVENOUS EVERY 10 MIN PRN
Status: DISCONTINUED | OUTPATIENT
Start: 2021-06-23 | End: 2021-06-23 | Stop reason: HOSPADM

## 2021-06-23 RX ORDER — OXYCODONE HYDROCHLORIDE AND ACETAMINOPHEN 5; 325 MG/1; MG/1
1 TABLET ORAL EVERY 6 HOURS PRN
Qty: 28 TABLET | Refills: 0 | Status: SHIPPED | OUTPATIENT
Start: 2021-06-23 | End: 2021-06-30

## 2021-06-23 RX ORDER — SODIUM CHLORIDE 0.9 % (FLUSH) 0.9 %
10 SYRINGE (ML) INJECTION PRN
Status: DISCONTINUED | OUTPATIENT
Start: 2021-06-23 | End: 2021-06-23 | Stop reason: HOSPADM

## 2021-06-23 RX ADMIN — Medication 5 MG: at 10:44

## 2021-06-23 RX ADMIN — FENTANYL CITRATE 50 MCG: 50 INJECTION, SOLUTION INTRAMUSCULAR; INTRAVENOUS at 10:58

## 2021-06-23 RX ADMIN — BUPIVACAINE HYDROCHLORIDE 20 ML: 5 INJECTION, SOLUTION EPIDURAL; INTRACAUDAL at 09:38

## 2021-06-23 RX ADMIN — FENTANYL CITRATE 100 MCG: 50 INJECTION, SOLUTION INTRAMUSCULAR; INTRAVENOUS at 09:17

## 2021-06-23 RX ADMIN — PROPOFOL 200 MG: 10 INJECTION, EMULSION INTRAVENOUS at 09:44

## 2021-06-23 RX ADMIN — Medication 0.8 MG: at 10:44

## 2021-06-23 RX ADMIN — DEXAMETHASONE SODIUM PHOSPHATE 8 MG: 4 INJECTION, SOLUTION INTRAMUSCULAR; INTRAVENOUS at 09:55

## 2021-06-23 RX ADMIN — LIDOCAINE HYDROCHLORIDE 100 MG: 20 INJECTION, SOLUTION EPIDURAL; INFILTRATION; INTRACAUDAL; PERINEURAL at 09:44

## 2021-06-23 RX ADMIN — ONDANSETRON 4 MG: 2 INJECTION INTRAMUSCULAR; INTRAVENOUS at 09:55

## 2021-06-23 RX ADMIN — SODIUM CHLORIDE, POTASSIUM CHLORIDE, SODIUM LACTATE AND CALCIUM CHLORIDE: 600; 310; 30; 20 INJECTION, SOLUTION INTRAVENOUS at 09:01

## 2021-06-23 RX ADMIN — BUPIVACAINE HYDROCHLORIDE 20 ML: 5 INJECTION, SOLUTION EPIDURAL; INTRACAUDAL at 09:37

## 2021-06-23 RX ADMIN — MIDAZOLAM 2 MG: 1 INJECTION INTRAMUSCULAR; INTRAVENOUS at 09:17

## 2021-06-23 RX ADMIN — CEFAZOLIN 2000 MG: 10 INJECTION, POWDER, FOR SOLUTION INTRAVENOUS at 09:35

## 2021-06-23 RX ADMIN — FENTANYL CITRATE 50 MCG: 50 INJECTION, SOLUTION INTRAMUSCULAR; INTRAVENOUS at 09:44

## 2021-06-23 RX ADMIN — ROCURONIUM BROMIDE 50 MG: 10 INJECTION, SOLUTION INTRAVENOUS at 09:44

## 2021-06-23 ASSESSMENT — PULMONARY FUNCTION TESTS
PIF_VALUE: 20
PIF_VALUE: 19
PIF_VALUE: 20
PIF_VALUE: 20
PIF_VALUE: 19
PIF_VALUE: 19
PIF_VALUE: 3
PIF_VALUE: 28
PIF_VALUE: 19
PIF_VALUE: 20
PIF_VALUE: 19
PIF_VALUE: 21
PIF_VALUE: 20
PIF_VALUE: 19
PIF_VALUE: 19
PIF_VALUE: 13
PIF_VALUE: 18
PIF_VALUE: 19
PIF_VALUE: 18
PIF_VALUE: 2
PIF_VALUE: 20
PIF_VALUE: 18
PIF_VALUE: 17
PIF_VALUE: 16
PIF_VALUE: 2
PIF_VALUE: 3
PIF_VALUE: 16
PIF_VALUE: 5
PIF_VALUE: 20
PIF_VALUE: 19
PIF_VALUE: 0
PIF_VALUE: 20
PIF_VALUE: 4
PIF_VALUE: 9
PIF_VALUE: 18
PIF_VALUE: 20
PIF_VALUE: 21
PIF_VALUE: 19
PIF_VALUE: 19
PIF_VALUE: 20
PIF_VALUE: 16
PIF_VALUE: 19
PIF_VALUE: 2
PIF_VALUE: 20
PIF_VALUE: 21
PIF_VALUE: 2
PIF_VALUE: 18
PIF_VALUE: 20
PIF_VALUE: 20
PIF_VALUE: 2
PIF_VALUE: 20
PIF_VALUE: 18
PIF_VALUE: 12
PIF_VALUE: 20
PIF_VALUE: 3
PIF_VALUE: 16
PIF_VALUE: 19
PIF_VALUE: 20
PIF_VALUE: 20
PIF_VALUE: 3
PIF_VALUE: 20
PIF_VALUE: 20
PIF_VALUE: 1
PIF_VALUE: 20
PIF_VALUE: 2
PIF_VALUE: 16
PIF_VALUE: 20
PIF_VALUE: 20
PIF_VALUE: 1
PIF_VALUE: 35
PIF_VALUE: 20
PIF_VALUE: 20
PIF_VALUE: 2
PIF_VALUE: 20
PIF_VALUE: 3
PIF_VALUE: 16
PIF_VALUE: 15
PIF_VALUE: 20
PIF_VALUE: 21
PIF_VALUE: 3

## 2021-06-23 ASSESSMENT — PAIN SCALES - GENERAL: PAINLEVEL_OUTOF10: 0

## 2021-06-23 ASSESSMENT — ENCOUNTER SYMPTOMS: SHORTNESS OF BREATH: 0

## 2021-06-23 ASSESSMENT — PAIN - FUNCTIONAL ASSESSMENT: PAIN_FUNCTIONAL_ASSESSMENT: 0-10

## 2021-06-23 NOTE — ANESTHESIA PRE PROCEDURE
Department of Anesthesiology  Preprocedure Note       Name:  Clarence Cool   Age:  56 y.o.  :  1960                                          MRN:  [id number]         Date:  2021      Surgeon: Storm Suarez):  Rashad Lainez DPM    Procedure: Procedure(s):  SECONDARY REPAIR OF ACHILLES, LEFT - POPLITEAL/SAPHENOUS BLOCK, LEFT  ENDOSCOPIC GASTROCNEMIUS RECESSION, LEFT - POPLITEAL/SAPHENOUS BLOCK, LEFT  EXOSTECTOMY OF CALCANEAL SPUR, LEFT; APPLICATION OF BELOW THE KNEE SPLINT, LEFT -POPLITEAL/SAPHENOUS BLOCK, LEFT (53491,02137)    Medications prior to admission:   Prior to Admission medications    Medication Sig Start Date End Date Taking?  Authorizing Provider   atorvastatin (LIPITOR) 20 MG tablet Take 1 tablet by mouth daily 21  Yes PRIYA Rodriguez CNP   omeprazole (PRILOSEC) 40 MG delayed release capsule Take 1 capsule by mouth daily 21  Yes PRIYA Rodriguez CNP   tadalafil (CIALIS) 5 MG tablet TAKE 1 TABLET BY MOUTH ONCE DAILY 21  Yes Historical Provider, MD       Current medications:    Current Facility-Administered Medications   Medication Dose Route Frequency Provider Last Rate Last Admin    lactated ringers infusion   Intravenous Continuous Rashad Lainez DPM 50 mL/hr at 21 0901 New Bag at 21 0901    lidocaine PF 1 % injection 0.5 mL  0.5 mL Intradermal Once Maria Del Rosario Johnson DPM        ceFAZolin (ANCEF) 2000 mg in dextrose 5 % 50 mL IVPB  2,000 mg Intravenous On Call to [address], [place]           Allergies:  No Known Allergies    Problem List:    Patient Active Problem List   Diagnosis Code    Personal history of prostate cancer Z85.46    Mixed hyperlipidemia E78.2    Erectile dysfunction following simple prostatectomy N52.34    Obesity, Class I, BMI 30-34.9 E66.9    Achilles tendinosis of left lower extremity M67.88    Routine general medical examination at a health care facility Z00.00       Past Medical History:        Diagnosis Date    Arthritis  Ganglion cyst     GERD (gastroesophageal reflux disease)     History of prostate cancer 2018    s/p tx: Under care of urology.  Hyperlipidemia     S/P colonoscopy 01/2016    per pt' next in 9hhn=7318:see scanned report    Vitamin D deficiency        Past Surgical History:        Procedure Laterality Date    HERNIA REPAIR  1982    PAIN MANAGEMENT PROCEDURE Right 6/24/2020    LEFT L4 AND L5 TRANSFORAMINAL EPIDURAL STEROID INJECTION WITH FLUOROSCOPY (85104, 66800) performed by Julio Downs MD at [facility] Bilateral 7/8/2020    BILATERAL L5 TRANSFORAMINAL EPIDURAL STEROID INJECTION WITH FLUOROSCOPY performed by Julio Downs MD at [place] [address]    prostate cancer surgery    SHOULDER SURGERY Left 2017       Social History:    Social History     Tobacco Use    Smoking status: Never Smoker    Smokeless tobacco: Never Used   Substance Use Topics    Alcohol use: Yes     Comment: rarely                                Counseling given: Not Answered      Vital Signs (Current):   Vitals:    06/21/21 0947 06/23/21 0849   BP:  126/86   Pulse:  62   Resp:  16   Temp:  96.9 °F (36.1 °C)   TempSrc:  Temporal   SpO2:  98%   Weight: 254 lb (115.2 kg) 253 lb (114.8 kg)   Height: 6' 2\" (1.88 m) 6' 2\" (1.88 m)                                              BP Readings from Last 3 Encounters:   06/23/21 126/86   06/03/21 110/73   06/02/21 113/86       NPO Status: Time of last liquid consumption: 2000                        Time of last solid consumption: 2000                        Date of last liquid consumption: 06/22/21                        Date of last solid food consumption: 06/22/21    BMI:   Wt Readings from Last 3 Encounters:   06/23/21 253 lb (114.8 kg)   06/03/21 254 lb (115.2 kg)   06/02/21 254 lb (115.2 kg)     Body mass index is 32.48 kg/m².     CBC:   Lab Results   Component Value Date    WBC 3.6 06/03/2021    RBC 5.60 06/03/2021    HGB 14.7 06/03/2021 HCT 44.1 06/03/2021    MCV 78.9 06/03/2021    RDW 14.9 06/03/2021     06/03/2021       CMP:   Lab Results   Component Value Date     06/03/2021    K 5.1 06/03/2021    K 4.3 08/17/2019     06/03/2021    CO2 25 06/03/2021    BUN 9 06/03/2021    CREATININE 1.2 06/03/2021    GFRAA >60 06/03/2021    AGRATIO 1.9 06/03/2021    LABGLOM >60 06/03/2021    GLUCOSE 93 06/03/2021    PROT 6.9 06/03/2021    CALCIUM 9.5 06/03/2021    BILITOT 0.5 06/03/2021    ALKPHOS 46 06/03/2021    AST 14 06/03/2021    ALT 13 06/03/2021       POC Tests:   Recent Labs     06/23/21  0900   POCGLU 102*       Coags: No results found for: PROTIME, INR, APTT    HCG (If Applicable): No results found for: PREGTESTUR, PREGSERUM, HCG, HCGQUANT     ABGs: No results found for: PHART, PO2ART, TZZ3KOK, KRB2BBS, BEART, U3AJUNWN     Type & Screen (If Applicable):  No results found for: LABABO, LABRH    Drug/Infectious Status (If Applicable):  No results found for: HIV, HEPCAB    COVID-19 Screening (If Applicable):   Lab Results   Component Value Date    COVID19 Not Detected 07/02/2020           Anesthesia Evaluation  Patient summary reviewed and Nursing notes reviewed no history of anesthetic complications:   Airway: Mallampati: I  TM distance: >3 FB   Neck ROM: full  Mouth opening: > = 3 FB Dental:    (+) partials      Pulmonary:       (-) asthma and shortness of breath                           Cardiovascular:    (+) hyperlipidemia    (-) hypertension and  angina                Neuro/Psych:      (-) CVA           GI/Hepatic/Renal:   (+) GERD: well controlled,      (-) liver disease       Endo/Other:    (+) : arthritis:., .    (-) diabetes mellitus, hypothyroidism               Abdominal:           Vascular:     - PVD. Anesthesia Plan      general     ASA 2     (Pt consented for popliteal and adductor canal nerve blocks for post-operative pain control.  Discussed risks/benefits of procedure, including bleeding/infection, nerve injury, LAST. Pt understood and expressed understanding to continue.)  Induction: intravenous. MIPS: Postoperative opioids intended and Prophylactic antiemetics administered. Anesthetic plan and risks discussed with patient. Plan discussed with CRNA.                 Jeff Conn MD   6/23/2021

## 2021-06-23 NOTE — OP NOTE
Operative Note    Render Jg Cool  [id number]  YOB: 1960    Surgeon: Selnea Romero DPM  Assistant: Leela Antoine, PGY-2  Pre-operative Diagnosis:     1. Chronic tear of Achilles tendon, left (W74.770)    2. Short Achilles tendon, left (M67.02)    3. Calcaneal spur, left (M77.32)    4. Pain in left foot (M25.572)  Post-operative Diagnosis: same  Procedure:     1. Secondary repair of Achilles tendon, left (76666)    2. Endoscopic gastrocnemius recession, left (25852)    3. Exostectomy of calcaneal spur, left (25135)    4. Application of below knee splint, left (10333)  Pathology: none obtained  Anesthesia: general, popliteal block  Hemostasis: thigh tourniquet, total time 55 minutes  Est. Blood Loss: <15cc   Materials: Arthrex swivel lock anchors x4  Injectables: none    INDICATIONS FOR PROCEDURE: This patient has signs and symptoms clinically  consistent with the above mentioned preoperative diagnosis of chronic tearing of the achilles tendon 2/2 shortness of the tendon and spurring of the calcaneus. Having failed conservative treatment, including boot immobilization, home therapy exercises, and shoe modifications, it was determined that the patient would benefit from surgical intervention. All potential risks, benefits, and complications were discussed with the patient prior to the scheduling of surgery. All the patient's questions were answered and no guarantees were given. The patient wished to proceed with surgery, and informed written consent was obtained. DETAILS OF PROCEDURE: The patient was brought from the pre-operative area to the operating room. Following induction of general anesthesia on the patient's hospital bed, a pneumatic thigh tourniquet was placed around the patient's well-padded left lower extremity.  The patient was then carefully placed in a prone position on the operating table, with special care being taken to pad any bony prominences and to ensure proper positioning of the entire body. The left lower extremity was then scrubbed, prepped, and draped in the usual sterile fashion. A time-out was performed. The patient, procedure, and operative site were confirmed. An Esmarch bandage was then utilized to exsanguinate the patient's left lower extremity. The tourniquet was then inflated to 325 mmHg and the following procedure was performed.     Procedure #1: Endoscopic gastrocnemius recession, left (13333)  At this time, attention was directed toward the patient's medial gastrosoleal complex, approximately 12 cm from the posterior inferior calcaneus where the gastroc aponeurosis medially palpated and incision was created at this level in the medial leg.  The incision was deepened down through the layers of subcutaneous tissue using sharp and blunt dissection.  All venous tributaries were isolated and electrocoagulated as encountered.  Dissection continued down to the fascia overlying the muscle belly of the gastrosoleal complex.  The foot was placed in dorsiflexed position and the gastroc aponeurosis was observed.  A punctiform incision was created with a dissecting scissors through the fascia.  The fascial elevator was then placed beneath the fascia and inferior to the gastroc aponeurosis from medial to lateral across the entire aponeurosis.  The fascial elevator was then removed and a sliding obturator and cannula were placed along the same path.  The endoscope was then placed through the cannula and the gastroc aponeurosis was visualized, as the foot was dorsiflexed and plantarflexed at the ankle joint.  The aponeurosis was noted to be healthy in appearance.      At this time, the endoscopic blade was placed into the cannula and the gastroc aponeurosis was transected.  The aponeurosis was noted to be lengthened and the underlying muscle belly noted to be intact.  Upon completion, the ankle joint range of motion had increased to approximately 10 degrees of rearfoot dorsiflexion at the was utilized to free the exostosis from the surrounding soft tissue. Next, a curved osteotome was utilized to osteotomize the retrocalcaneal exostosis from proximal and distal medial lateral through-and-through. Following this, the osteotomized portion of bone was dissected free from all soft tissue attachments and removed en total from the surgical site. Once all bony prominences at the posterior calcaneus were excised, a hand rasp was utilized to smooth the posterior calcaneus until normal anatomic contour could be achieved. The wound was flushed with copious amounts of normal sterile saline. Attention was directed back to the inferior aspect of the achilles tendon were the previously mentioned intrasubstance calcifications and degenerative fibrous tissue was noted. Using sharp dissection, the intrasubstance calcifications were dissected free and passed from the operative field. The degenerative fibrous tissue was excised until normal white healthy glistening tendinous tissue remained. C-arm was utilized to confirm complete resection of all exostosis and intrasubstance abnormalities. The wound was flushed with copious amounts of normal saline and attention was directed towards repair of the achilles tendon. At this time, an Arthrex swivel lock suture anchor system was utilized using standard technique and the 's recommendations. Four drill holes were created in the posterior inferior calcaneus, two anchors approximately 1cm superior to the insertion of the achilles tendon and two anchors approximately 1cm inferior to the insertion of the achilles tendon. These drill holes were tapped. First, two anchors were inserted, 2 anchors 1 cm proximal to the insertion of the Achilles.  The provided fiberwire tape attached to the anchors was utilized to work up and down to the achilles tendon to both repair the linear bisection of the tendon in a modified Zeny suture fashion and to anchor the achilles tendon back down to the calcaneus. 2-0 vicryl was then used to reinforce and repair the midline incision. Once secured to the tendon, the fiberwire was passed in a crossing fashion through the second set of anchors in the Arthrex swivel lock system and, with the foot held in a neutral position, the anchors were inserted using standard technique and the 's recommendations into the two drill holes 1cm distal to the achilles tendon insertion. The ankle was taken through a full range of motion and good approximation and tension of the achilles tendon were noted. The wound was flushed with copious amounts of normal sterile saline. The paratenon was re-approximated using 3-0 vicryl. The subcutaneous tissue was re-approximated using 4-0 vicryl. The skin was re-approximated using 5-0 vicryl in a running intradermal fashion. The incision was covered with masitol and steristrips and painted with betadine. Procedure #4: Application of below knee splint, left (06307)  At this time, a local anesthetic was injected in a regional block fashion about the patients surgical sites for the patients postoperative comfort and soft dry sterile dressing was applied. The pneumatic thigh tourniquet was rapidly deflated, after a total time of 55 minutes, and a prompt instantaneous hyperemic response was noted on all aspects of the patients left lower extremity. Next, copious amounts of cast padding was applied to the patient's left lower extremity from the metatarsal heads to approximately 3 finger breaths distal to the head and neck of the fibula. Next, using moistened padded splint material, a posterior splint was applied from the plantar foot posteriorly up the leg to approximately the midcalf area. ACE compression was then applied from distal to proximal to secure the posterior splint in place and provide compression to prevent post-operative edema.  At this time, the foot was then held at neutral to prevent acquired equinus deformity and relieve tension on the surgical repair. END OF PROCEDURE: The patient tolerated the procedure and anesthesia well and was transported from the operating room to the PACU with vital signs stable and vascular status intact to all aspects of the patient's left lower extremity and digital capillary refill time immediate to the digits of the left  foot. Following a period of post-operative monitoring, the patient will be discharged home with written and oral wound care and follow-up instructions. The patient is to follow-up with me in my private office within 3-5 days. The patient is to keep dressing clean, dry and intact at all times. The patient is to call with if any complications occur.     Jennifer Menchaca DPM  [phone]

## 2021-06-23 NOTE — ANESTHESIA PROCEDURE NOTES
Peripheral Block    Patient location during procedure: pre-op  Start time: 6/23/2021 9:32 AM  End time: 6/23/2021 9:34 AM  Staffing  Performed: anesthesiologist   Anesthesiologist: Osito Dill MD  Preanesthetic Checklist  Completed: patient identified, IV checked, site marked, risks and benefits discussed, surgical consent, monitors and equipment checked, pre-op evaluation, timeout performed, anesthesia consent given, oxygen available and patient being monitored  Peripheral Block  Patient position: supine  Prep: ChloraPrep  Patient monitoring: cardiac monitor, continuous pulse ox, frequent blood pressure checks and IV access  Block type: Femoral  Laterality: left  Injection technique: single-shot  Guidance: ultrasound guided  Local infiltration: lidocaine  Infiltration strength: 1 %  Dose: 3 mL  Adductor canal (Low Femoral)  Provider prep: mask and sterile gloves  Local infiltration: lidocaine  Needle  Needle type: long-bevel   Needle gauge: 20 G  Needle length: 10 cm  Needle localization: ultrasound guidance  Assessment  Injection assessment: negative aspiration for heme, no paresthesia on injection and local visualized surrounding nerve on ultrasound  Paresthesia pain: none  Slow fractionated injection: yes  Hemodynamics: stable  Additional Notes  U/S 57450.  (1) Under ultrasound guidance, a 20 gauge needle was inserted and placed in close proximity to the saph nerve.  (2) Ultrasound was also used to visualize the spread of the anesthetic in close proximity to the nerve being blocked. (3) The nerve appeared anatomically normal, and (4 there were no apparent abnormal pathological findings on the image that were readily visible and related to the nerve being blocked. (5) A permanent ultrasound image was saved in the patient's record.             Medications Administered  Bupivacaine (MARCAINE) PF injection 0.5%, 20 mL  Reason for block: post-op pain management and at surgeon's request

## 2021-06-23 NOTE — ANESTHESIA PROCEDURE NOTES
Peripheral Block    Patient location during procedure: pre-op  Start time: 6/23/2021 9:21 AM  End time: 6/23/2021 9:27 AM  Staffing  Performed: anesthesiologist   Anesthesiologist: Nadine Torres MD  Preanesthetic Checklist  Completed: patient identified, IV checked, site marked, risks and benefits discussed, surgical consent, monitors and equipment checked, pre-op evaluation, timeout performed, anesthesia consent given, oxygen available and patient being monitored  Peripheral Block  Patient position: supine  Prep: ChloraPrep  Patient monitoring: cardiac monitor, continuous pulse ox, frequent blood pressure checks and IV access  Block type: Sciatic  Laterality: left  Injection technique: single-shot  Guidance: nerve stimulator and ultrasound guided  Local infiltration: lidocaine  Infiltration strength: 1 %  Dose: 3 mL  Popliteal  Provider prep: mask and sterile gloves  Local infiltration: lidocaine  Needle  Needle type: long-bevel   Needle gauge: 20 G  Needle length: 10 cm  Needle localization: ultrasound guidance and nerve stimulator  Assessment  Injection assessment: negative aspiration for heme, no paresthesia on injection and local visualized surrounding nerve on ultrasound  Paresthesia pain: none  Slow fractionated injection: yes  Hemodynamics: stable  Additional Notes  U/S 92603.  (1) Under ultrasound guidance, a 20 gauge needle was inserted and placed in close proximity to the sciatic nerve.  (2) Ultrasound was also used to visualize the spread of the anesthetic in close proximity to the nerve being blocked. (3) The nerve appeared anatomically normal, and (4 there were no apparent abnormal pathological findings on the image that were readily visible and related to the nerve being blocked. (5) A permanent ultrasound image was saved in the patient's record.             Medications Administered  Bupivacaine (MARCAINE) PF injection 0.5%, 20 mL  Reason for block: post-op pain management and at surgeon's

## 2021-06-23 NOTE — H&P
Department of Podiatric Surgery  History and Physical Update      HISTORY OF PRESENT ILLNESS:      The patient is a 56 y.o. male presents today for operative correction of chronic tearing of the achilles tendon and mild calcaneal spurring of the left foot. The patient underwent a history and physical with their primary care physician <30 days ago and denies any changes since. Past Medical History:        Diagnosis Date    Arthritis     Ganglion cyst     GERD (gastroesophageal reflux disease)     History of prostate cancer 2018    s/p tx: Under care of urology.  Hyperlipidemia     S/P colonoscopy 01/2016    per pt' next in [id number]:see scanned report    Vitamin D deficiency        Past Surgical History:        Procedure Laterality Date    HERNIA REPAIR  1982    PAIN MANAGEMENT PROCEDURE Right 6/24/2020    LEFT L4 AND L5 TRANSFORAMINAL EPIDURAL STEROID INJECTION WITH FLUOROSCOPY (05812, [id number]) performed by Stef Collins MD at [address] Bilateral 7/8/2020    BILATERAL L5 TRANSFORAMINAL EPIDURAL STEROID INJECTION WITH FLUOROSCOPY performed by Stef Collins MD at [facility]  2018    prostate cancer surgery    SHOULDER SURGERY Left 2017                 Medications Prior to Admission:   Medications Prior to Admission: atorvastatin (LIPITOR) 20 MG tablet, Take 1 tablet by mouth daily  omeprazole (PRILOSEC) 40 MG delayed release capsule, Take 1 capsule by mouth daily  tadalafil (CIALIS) 5 MG tablet, TAKE 1 TABLET BY MOUTH ONCE DAILY    Allergies:  Patient has no known allergies. Social History:   TOBACCO:   reports that he has never smoked. He has never used smokeless tobacco.  ETOH:   reports current alcohol use. DRUGS:   reports no history of drug use.     Family History:       Problem Relation Age of Onset    Sickle Cell Trait Mother     No Known Problems Father        REVIEW OF SYSTEMS:  CONSTITUTIONAL:  negative  RESPIRATORY: negative  CARDIOVASCULAR:  negative  GASTROINTESTINAL:  negative  MUSCULOSKELETAL:  negative  NEUROLOGICAL:  negative    PHYSICAL EXAM:  VITALS:  /86   Pulse 62   Temp 96.9 °F (36.1 °C) (Temporal)   Resp 16   Ht 6' 2\" (1.88 m)   Wt 253 lb (114.8 kg)   SpO2 98%   BMI 32.48 kg/m²   CONSTITUTIONAL:  awake, alert, cooperative, no apparent distress, and appears stated age  EYES:  pupils equal, round and reactive to light, extra ocular muscles intact, sclera clear, conjunctiva normal  ENT:  normocepalic, without obvious abnormality  NECK:  Supple, symmetrical, trachea midline, no adenopathy, thyroid symmetric, not enlarged and no tenderness, skin normal  LUNGS:  No increased work of breathing, good air exchange, clear to auscultation bilaterally, no crackles or wheezing  CARDIOVASCULAR:  Normal apical impulse, regular rate and rhythm  ABDOMEN:  normal bowel sounds, soft, non-distended, non-tender, no masses palpated, no hepatosplenomegally      ASSESSMENT AND PLAN:  1. Chronic tear of achilles tendon, left foot  2. Short achilles tendon, left foot  3.  Calcaneal spur, left    - Plan for secondary reapir of the achilles tendon, left foot with endoscopic gastrocnemius recession and exostectomy of the calcaneal spur, left      Danielna MARIO Lopez  [phone]

## 2021-06-23 NOTE — ANESTHESIA POSTPROCEDURE EVALUATION
Department of Anesthesiology  Postprocedure Note    Patient: Zaida Portillo  MRN: [id number]  YOB: 1960  Date of evaluation: 6/23/2021  Time:  1:03 PM     Procedure Summary     Date: 06/23/21 Room / Location: [address]    Anesthesia Start: 2265 Anesthesia Stop: 1120    Procedures:       SECONDARY REPAIR OF ACHILLES, LEFT - POPLITEAL/SAPHENOUS BLOCK, LEFT (Left Foot)      ENDOSCOPIC GASTROCNEMIUS RECESSION, LEFT - POPLITEAL/SAPHENOUS BLOCK, LEFT (Left Leg Lower)      EXOSTECTOMY OF CALCANEAL SPUR, LEFT; APPLICATION OF BELOW THE KNEE SPLINT, LEFT -POPLITEAL/SAPHENOUS BLOCK, LEFT (85954,61255) (Left Foot) Diagnosis: (N17.777 CHRONIC TEAR OF LEFT ACHILLES TENDON; M67.02 SHORT ACHILLES, LEFT; M77.82 CALCANEAL SPUR, LEFT; M25.572 PAIN IN LEFT FOOT)    Surgeons: Kelsey Rehman DPM Responsible Provider: Xenia Lee MD    Anesthesia Type: general ASA Status: 2          Anesthesia Type: general    Yolanda Phase I: Yolanda Score: 10    Yolanda Phase II: Yolanda Score: 10    Last vitals: Reviewed and per EMR flowsheets.        Anesthesia Post Evaluation    Patient location during evaluation: PACU  Patient participation: complete - patient participated  Level of consciousness: awake and alert  Airway patency: patent  Nausea & Vomiting: no nausea and no vomiting  Complications: no  Cardiovascular status: hemodynamically stable  Respiratory status: acceptable  Hydration status: stable

## 2021-07-03 PROBLEM — Z00.00 ROUTINE GENERAL MEDICAL EXAMINATION AT A HEALTH CARE FACILITY: Status: RESOLVED | Noted: 2021-06-03 | Resolved: 2021-07-03

## 2021-10-11 ENCOUNTER — TELEPHONE (OUTPATIENT)
Dept: INTERNAL MEDICINE CLINIC | Age: 61
End: 2021-10-11

## 2021-10-11 NOTE — TELEPHONE ENCOUNTER
----- Message from Marta Wen sent at 10/11/2021  8:05 AM EDT -----  Subject: Message to Provider    QUESTIONS  Information for Provider? Pt called and said he is having pain in back and   would like to have prednisone called for him. he has had to do this before   and didnt if this could happen again. Please call him and let him know.   ---------------------------------------------------------------------------  --------------  CALL BACK INFO  What is the best way for the office to contact you? OK to leave message on   voicemail  Preferred Call Back Phone Number? [phone]  ---------------------------------------------------------------------------  --------------  SCRIPT ANSWERS  Relationship to Patient?  Self

## 2021-10-11 NOTE — TELEPHONE ENCOUNTER
Pt asking if Mark Jane will be sending in a prescription of prednisone for his back pain  [facility], [address] [facility] [phone]    Please contact

## 2021-10-21 NOTE — TELEPHONE ENCOUNTER
Yes. Addressed via alternate phone call encounter.  Asked for appointment, patient stated he's rather just go to ED

## 2021-10-25 RX ORDER — OMEPRAZOLE 40 MG/1
CAPSULE, DELAYED RELEASE ORAL
Qty: 30 CAPSULE | Refills: 0 | Status: SHIPPED | OUTPATIENT
Start: 2021-10-25 | End: 2021-12-14

## 2021-10-27 ENCOUNTER — OFFICE VISIT (OUTPATIENT)
Dept: INTERNAL MEDICINE CLINIC | Age: 61
End: 2021-10-27
Payer: COMMERCIAL

## 2021-10-27 VITALS
DIASTOLIC BLOOD PRESSURE: 70 MMHG | TEMPERATURE: 96.8 F | HEIGHT: 74 IN | BODY MASS INDEX: 33.11 KG/M2 | OXYGEN SATURATION: 99 % | WEIGHT: 258 LBS | HEART RATE: 58 BPM | SYSTOLIC BLOOD PRESSURE: 110 MMHG

## 2021-10-27 DIAGNOSIS — G89.29 CHRONIC BILATERAL LOW BACK PAIN WITHOUT SCIATICA: Primary | ICD-10-CM

## 2021-10-27 DIAGNOSIS — Z23 NEED FOR INFLUENZA VACCINATION: ICD-10-CM

## 2021-10-27 DIAGNOSIS — M54.50 CHRONIC BILATERAL LOW BACK PAIN WITHOUT SCIATICA: Primary | ICD-10-CM

## 2021-10-27 DIAGNOSIS — Z23 NEED FOR SHINGLES VACCINE: ICD-10-CM

## 2021-10-27 PROCEDURE — 90750 HZV VACC RECOMBINANT IM: CPT | Performed by: NURSE PRACTITIONER

## 2021-10-27 PROCEDURE — 90471 IMMUNIZATION ADMIN: CPT | Performed by: NURSE PRACTITIONER

## 2021-10-27 PROCEDURE — 99214 OFFICE O/P EST MOD 30 MIN: CPT | Performed by: NURSE PRACTITIONER

## 2021-10-27 PROCEDURE — 90674 CCIIV4 VAC NO PRSV 0.5 ML IM: CPT | Performed by: NURSE PRACTITIONER

## 2021-10-27 PROCEDURE — 90472 IMMUNIZATION ADMIN EACH ADD: CPT | Performed by: NURSE PRACTITIONER

## 2021-10-27 ASSESSMENT — PATIENT HEALTH QUESTIONNAIRE - PHQ9
SUM OF ALL RESPONSES TO PHQ QUESTIONS 1-9: 0
1. LITTLE INTEREST OR PLEASURE IN DOING THINGS: 0
SUM OF ALL RESPONSES TO PHQ9 QUESTIONS 1 & 2: 0
2. FEELING DOWN, DEPRESSED OR HOPELESS: 0
SUM OF ALL RESPONSES TO PHQ QUESTIONS 1-9: 0
DEPRESSION UNABLE TO ASSESS: FUNCTIONAL CAPACITY MOTIVATION LIMITS ACCURACY
SUM OF ALL RESPONSES TO PHQ QUESTIONS 1-9: 0

## 2021-10-27 ASSESSMENT — ENCOUNTER SYMPTOMS
COLOR CHANGE: 0
CONSTIPATION: 0
ABDOMINAL PAIN: 0
SINUS PRESSURE: 0
COUGH: 0
SHORTNESS OF BREATH: 0
WHEEZING: 0
DIARRHEA: 0
BACK PAIN: 1
SINUS PAIN: 0

## 2021-10-27 NOTE — PROGRESS NOTES
Nancy Cool (:  1960) is a 56 y.o. male,Established patient, here for evaluation of the following chief complaint(s):  Back Pain      ASSESSMENT/PLAN:  1. Chronic bilateral low back pain without sciatica  Assessment & Plan:  Discussed options at length  Will start diclofenac  Can continue muscle relaxers as needed  Encouraged frequent movement  Will start physical therapy  Follow-up in 3 months    Orders:  -     [facility] [place]      No follow-ups on file. SUBJECTIVE/OBJECTIVE:  HPI  Patient is here to follow-up on lower back pain. This is been an ongoing issue for a number of years. Reports pain has been overall controlled until the recent months. He is now having pain almost on a daily basis. Pain tends to worsen if he is sedentary. Recently underwent Achilles tendon surgery which limited his exercise and movement. He has been taking Tylenol and ibuprofen which has not helped much. Pain is somewhat improved when he is able to exercise. Pain is described as a throbbing pain in the lower back bilaterally. Pain does not radiate. No numbness or tingling or weakness. Had an MRI in 2020. He did undergo steroid injection following that MRI. This gave him some relief. Recently had acute flare of muscle spasms and back pain. He went to urgent care and was given prednisone x7 days plus muscle relaxers which helped minimize his pain. He continues to have daily chronic pain today.     Current Outpatient Medications   Medication Sig Dispense Refill    diclofenac (VOLTAREN) 50 MG EC tablet Take 1 tablet by mouth 2 times daily 60 tablet 3    omeprazole (PRILOSEC) 40 MG delayed release capsule Take 1 capsule by mouth once daily 30 capsule 0    promethazine (PHENERGAN) 25 MG tablet Take 1 tablet by mouth every 6 hours as needed for Nausea 24 tablet 0    atorvastatin (LIPITOR) 20 MG tablet Take 1 tablet by mouth daily 30 tablet 2    tadalafil (CIALIS) 5 MG tablet TAKE 1 TABLET BY MOUTH ONCE DAILY       No current facility-administered medications for this visit. Review of Systems   Constitutional: Negative for chills, fatigue and fever. HENT: Negative for congestion, sinus pressure and sinus pain. Respiratory: Negative for cough, shortness of breath and wheezing. Cardiovascular: Negative for chest pain and palpitations. Gastrointestinal: Negative for abdominal pain, constipation and diarrhea. Musculoskeletal: Positive for back pain. Negative for arthralgias and myalgias. Skin: Negative for color change, pallor and rash. Neurological: Negative for dizziness, syncope, weakness, light-headedness and headaches. Psychiatric/Behavioral: Negative for behavioral problems, confusion and sleep disturbance. The patient is not nervous/anxious. Vitals:    10/27/21 1306   BP: 110/70   Site: Left Upper Arm   Position: Sitting   Cuff Size: Medium Adult   Pulse: 58   Temp: 96.8 °F (36 °C)   SpO2: 99%   Weight: 258 lb (117 kg)   Height: 6' 2\" (1.88 m)       Physical Exam  Constitutional:       Appearance: He is well-developed. HENT:      Head: Normocephalic and atraumatic. Eyes:      Conjunctiva/sclera: Conjunctivae normal.      Pupils: Pupils are equal, round, and reactive to light. Neck:      Thyroid: No thyromegaly. Vascular: No JVD. Cardiovascular:      Rate and Rhythm: Normal rate and regular rhythm. Heart sounds: Normal heart sounds. Pulmonary:      Effort: Pulmonary effort is normal. No respiratory distress. Breath sounds: Normal breath sounds. No wheezing. Musculoskeletal:         General: No deformity. Normal range of motion. Cervical back: Normal range of motion and neck supple. Skin:     General: Skin is warm and dry. Capillary Refill: Capillary refill takes less than 2 seconds. Neurological:      Mental Status: He is alert and oriented to person, place, and time.    Psychiatric:         Mood and Affect: Mood normal.         Behavior: Behavior normal.           An electronic signature was used to authenticate this note.     --Kathy Sahu, APRN - CNP

## 2021-10-27 NOTE — ASSESSMENT & PLAN NOTE
Discussed options at length  Will start diclofenac  Can continue muscle relaxers as needed  Encouraged frequent movement  Will start physical therapy  Follow-up in 3 months

## 2021-11-04 ENCOUNTER — HOSPITAL ENCOUNTER (OUTPATIENT)
Dept: PHYSICAL THERAPY | Age: 61
Setting detail: THERAPIES SERIES
Discharge: HOME | End: 2021-11-04
Payer: COMMERCIAL

## 2021-11-04 PROCEDURE — 97161 PT EVAL LOW COMPLEX 20 MIN: CPT

## 2021-11-04 PROCEDURE — 97110 THERAPEUTIC EXERCISES: CPT

## 2021-11-04 PROCEDURE — 97140 MANUAL THERAPY 1/> REGIONS: CPT

## 2021-11-04 NOTE — PLAN OF CARE
[x]No   []NA  Pt. reports bowel and bladder changes:      []Yes   [x]No   []NA   Pt. reports knee/hip/ankle buckling:      []Yes   [x]No   []NA       Current Functional Limitations:   [x]Yes   []No  Functional Complaints:      PLOF:   []No functional limitations   [x]Pre-exisiting limitations:>/=5 yrs over all feels no significant change in complaitns but is more severe painful   Pt's sleep is affected?    [x]Yes   []No        Social support/Environment:  Reports inc time for self care caution and anticipates pain     Relevant Medical History:   Co-morbidities/Complexities (which will affect course of rehabilitation): 57 y/o male   []None           Arthritic conditions   []Rheumatoid arthritis (M05.9)  []Osteoarthritis (M19.91)   Cardiovascular conditions   []Hypertension (I10)  []Hyperlipidemia (E78.5)  []Angina pectoris (I20)  []Atherosclerosis (I70)   Musculoskeletal conditions   [x]Disc pathology   []Congenital spine pathologies   []Prior surgical intervention  []Osteoporosis (M81.8)  []Osteopenia (M85.8)   Endocrine conditions   []Hypothyroid (E03.9)  []Hyperthyroid Gastrointestinal conditions   []Constipation (C46.50)   Metabolic conditions   []Morbid obesity (E66.01)  []Diabetes type 1(E10.65) or 2 (E11.65)   []Neuropathy (G60.9)     Pulmonary conditions   []Asthma (J45)  []Coughing   []COPD (J44.9)   Psychological Disorders  []Anxiety (F41.9)  []Depression (F32.9)   []Other:   [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         Occupation/School: Retired     Sports/Hobbies/Recreational Activities: NA     OBJECTIVE:     Functional Scale/Score:16/50, DONI 32%     Gait/Steps/Balance       []Gait WNL                             [x]Deviations on a level linoleum surface include:  Antalgic [place] asymmetrical step length stance time     Posture: [] WNL  []Forward head   []Forward flexed trunk    []Scoliosis   []Decreased WB on   []R   []L     [x]Other:   Dec lumbar lordosis    Quick Tests/Functional Myotome Tests:   Heel Walk (L4):      [x]NT  []Able to perform WNL   []Unable to perform         Toe Walk (S1):       [x]NT  []Able to perform WNL   []Unable to perform        Lumbar Range of MotionTesting      [x]All WFL except as marked below  ROM  Deficits         *denotes pain AROM  (% Decreased) COMMENTS   Flexion Mid shin    Extension Neutral     Sidebending Left WNL    Sidebending Right 25% from symmetrical + pain     Rotation Left  25% from WNL []Seated  [x]Standing       Rotation Right 25% from WNL []Seated  [x]Standing         Special Tests- Standing   (C)= Kuldeep's Criteria: 3/4 Positive tests or (+) Fortins sign with two additional (+) tests  Special Test Abnormal Findings   Janice's Sign                (C)                  []Neg   []Pos R   []Pos L      Standing Landmarks []Iliac Crests Equal   []R High  []L High  []PSIS Equal   []R High  []L High  []ASIS Equal   []R High  []L High     []Difficult to assess due to body habitus    Standing Flexion Test  (C) []Neg   []Pos R   []Pos L      March Test (Gillet's Test) []Neg   []Pos R   []Pos L      Sacral Sulci Test  []Neg   []Pos R   []Pos L          Special Tests- seated     Special Test Abnormal Findings   Seated pelvic landmarks (C) [x]Iliac Crests Equal   []R High   []L High  []PSIS Equal   []R High  []L High  [x]Difficult to assess due to body habitus   Sitting flexion test  []Neg   []Pos R   []Pos L      Hip IR PROM  []WNL [x]Pos R   [x]Pos L         Slump test/Dural tension  [x]Neg   []Pos R   []Pos L          Deep Tendon Reflexes     [x]All reflexes WNL or 2+ except as marked below  Abnormal Reflex Findings Left Right Comments   Aramis's Reflex      Biceps (C5,6)      Brachioradialis (C6)      Triceps (C7)      Quadriceps (L3,4)     []Pendular x 3 R  []Pendular x 3 L   Achilles (S1,2)      Ankle clonus , # of beats      Babinski's reflex           Dermatomal Sensation   [x]All dermatomes WFL for light touch except as marked below  Abnormal Dermatome Findings Left Right Anterior groin, 2-3 inches below ASIS (L1-L2)     Middle third anterior thigh (L3)     Patella and med malleolus (L4)     Fibular head and dorsum of foot (L5)     Lateral side and plantar surface of foot (S1)     Medial aspect of posterior thigh (S2)                   Range of Motion/Strength Testing-Myotomes    [x]All ROM WFL except as marked below   [x]All strength WFL (5/5) except as marked below    [x]All myotomes WFL (5/5) except as marked below     Range Tested MMT/ Resisted PROM AROM Comments   *denotes pain Left Right Left Right Left Right    Hip Flexion  (L1-2) 4/5  4+/5         Hip Extension 4-/5 4-/5        Hip Abduction  (L5) 4/5 4/5        Hip Adduction  (L3)          Hip IR 4/5 4/5   32 37 Tight end feels   Hip ER 4/5 4/5   25 38 Tight end feels   Knee Flexion  (L5,S1)          Knee Extension  (L3,4)          Ankle Dorsiflex  (L4)          Ankle Plantarflex  (S1,2)          Ankle Inversion          Ankle Eversion          Great Toe Ext  (L5)            [x] Not Tested  Trunk Strength     Trunk Extensors     Gluteals     Abdominals 2-/5         Flexibility    [] All tested [facility]    [x] Deficits indicated as follows:    Muscle Abnormal Findings   Hip flexors/Sam  [x]Decreased R   [x]Decreased L      Hamstrings  Degrees   SLR [x]Decreased R   [x]Decreased L     Right:    40 degrees          Left:   30 degrees   Gastrocs   []Decreased R   []Decreased L  Not tested      Obers/TFL/ITB   [x]Decreased R   [x]Decreased L      Piriformis    [x]Decreased R   [x]Decreased L      Other:    []Decreased R   []Decreased L        Special Tests Lumbosacral and hip- supine/sidelying/prone    (L) = Laslett's Criteria: 2 positive tests Not marked - Not tested  Special Test Abnormal Findings   Sit up test/ Supine Long sit test  (C) [x]Neg   []Pos R   []Pos L     Comments:    SI distraction                               (L) []Neg   []Pos   []NT   Thigh Thrust test                         (L) []Neg   []Pos R   []Pos L 90/90 test  []Neg   []Pos R   []Pos L      Gaenslen's test []Neg   []Pos R   []Pos L      Straight Leg Raise []Neg   [x]Pos R   []Pos L      Crams []Neg   []Pos R   []Pos L      Long axial distraction   Supine and prone  [x]Relief noted   []No relief noted  []Rebound pain   []NT   Hip scour []Neg   []Pos R   []Pos L      Edelmira's test [x]Neg   []Pos R   []Pos L      Rolando's test []Neg   []Pos R   []Pos L      Oscillation []Neg   []Pos R   []Pos L      Ant/Post Provocation  []Neg   []Pos R   []Pos L      SI compression                           (L) []Neg   []Pos      Prone knee flexion test               (C) [x]Neg   []Pos R   []Pos L     Comments:    Femoral nerve tension test []Neg   []Pos R   []Pos L      Pheasant test []Neg   []Pos R   []Pos L      Sacral thrusts                              (L) []Neg   []Pos     []Base   [][place]   []R Sacral Sulcus  []L Sacral Sulcus   []R TIEN   []L TIEN     Palpation     Patient reported tenderness with palpation:  [x]Yes :   []No     Hypersensitive with light palpation QL and paraspinals of the low back and gluts  Bandages/Dressings/Incisions: NA                       [x] Patient history, allergies, meds reviewed. Medical chart reviewed. See intake form. Review Of Systems (ROS):  [x]Performed Review of systems (Integumentary, CardioPulmonary, Neurological) by intake and observation. Intake form has been scanned into medical record. Patient has been instructed to contact their primary care physician regarding ROS issues if not already being addressed at this time.           Barriers to/and or personal factors that will affect rehab potential:              []Age  []Sex    []Smoker              []Motivation/Lack of Motivation                        []Co-Morbidities              []Cognitive Function, education/learning barriers              []Environmental, home barriers              []profession/work barriers  [x]past PT/medical experience short lived pain reduction []other:  Justification:     Falls Risk Assessment (30 days):   [x] Falls Risk assessed and no intervention required. [] Falls Risk assessed and Patient requires intervention due to being higher risk   TUG score (>12s at risk):     [] Falls education provided, including:         ASSESSMENT:acute on chronic exacerbation of generalized low back pain worse on the right, recent left achilles tendon surgery due to bony spurs in the foot impacting gait reports feels this may have impacted his walking and inc back pain, he also states because he was nonweight bearing and had become more sedentary, exam revels the following impairments, pt reports he is not at his typical pain levels but still able to do all that is needed for him from day to day.     Functional Impairments:     [x]Noted lumbar/proximal hip hypomobility   []Noted lumbosacral and/or generalized hypermobility   [x]Decreased Lumbosacral/hip/LE functional ROM   [x]Decreased core/proximal hip strength and neuromuscular control    [x]Decreased LE functional strength    [x]Abnormal reflexes/sensation/myotomal/dermatomal deficits  []Reduced balance/proprioceptive control    []other:      Functional Activity Limitations (from functional questionnaire and intake)   [x]Reduced ability to tolerate prolonged functional positions   [x]Reduced ability or difficulty with changes of positions or transfers between positions   [x]Reduced ability to maintain good posture and demonstrate good body mechanics with sitting, bending, and lifting   []Reduced ability to sleep   [x] Reduced ability or tolerance with driving and/or computer work   [x]Reduced ability to perform lifting, reaching, carrying tasks   [x]Reduced ability to squat   [x]Reduced ability to forward bend   [x]Reduced ability to ambulate prolonged functional periods/distances/surfaces   [x]Reduced ability to ascend/descend stairs   []other:       Participation Restrictions   [x]Reduced participation in self care activities   [x]Reduced participation in home management activities   []Reduced participation in work activities   [x]Reduced participation in social activities. []Reduced participation in sport/recreational activities. Prognosis/Rehab Potential:      []Excellent   [x]Good    []Fair   []Poor    Tolerance of evaluation/treatment:    []Excellent   [x]Good    []Fair   []Poor     Physical Therapy Evaluation Complexity Justification  [x] A history of present problem with:  [] no personal factors and/or comorbidities that impact the plan of care;  [x]1-2 personal factors and/or comorbidities that impact the plan of care  []3 personal factors and/or comorbidities that impact the plan of care  [x] An examination of body systems using standardized tests and measures addressing any of the following: body structures and functions (impairments), activity limitations, and/or participation restrictions;:  [] a total of 1-2 or more elements   [x] a total of 3 or more elements   [] a total of 4 or more elements   [x] A clinical presentation with:  [x] stable and/or uncomplicated characteristics   [] evolving clinical presentation with changing characteristics  [] unstable and unpredictable characteristics;   [x] Clinical decision making of [x] low, [] moderate, [] high complexity using standardized patient assessment instrument and/or measurable assessment of functional outcome. [x] EVAL (LOW) 13893 (typically 20 minutes face-to-face)  [] EVAL (MOD) 62870 (typically 30 minutes face-to-face)  [] EVAL (HIGH) 54351 (typically 45 minutes face-to-face)  [] RE-EVAL     PLAN: Begin PT focusing on: proximal hip mobilizations, LB mobs, LB core activation, proximal hip activation, and HEP    Frequency/Duration:  2 days per week for 4-6 Weeks:  Interventions:  [x]  Therapeutic exercise including: strength training, ROM, for Lower extremity and core   [x]  NMR activation and proprioception for LE, Glutes and Core.    [x]  Manual therapy as indicated for LE, Hip and spine to include: Dry Needling/IASTM, STM, PROM, Gr I-IV mobilizations, manipulation. [x] Therapeutic activities for LE and core. [x] Gait Training including gait normalization and reducing fall risk. [x] Modalities as needed that may include: thermal agents, E-stim, Biofeedback, US, iontophoresis as indicated  [x] Patient education on joint protection, postural re-education, activity modification, progression of HEP    HEP instruction: prone 5 mins, prone on elbows reps 10 reps 1-2 times daily, prone hip IR stretching lito 30s x 3 with TA, prone hip IR strengthening light resistance 15, hamstring stretch doorway 30s x 3  ( 3-4 times daily). GOALS:  Patient stated goal: 3  [] Progressing: [] Met: [] Not Met: [] Adjusted  Therapist goals for Patient:   Short Term Goals: To be achieved in: 2 weeks  1. Independent in HEP and progression per patient tolerance, in order to prevent re-injury. [] Progressing: [] Met: [] Not Met: [] Adjusted  2. Patient will have a decrease in pain to facilitate improvement in movement, function, and ADLs as indicated by Functional Deficits. [] Progressing: [] Met: [] Not Met: [] Adjusted    Long Term Goals: To be achieved in: 6 weeks  1. Disability index score of 5/50 from 16/50 per DONI to assist with reaching prior level of function. [] Progressing: [] Met: [] Not Met: [] Adjusted  2. Patient will demonstrate increased AROM to WNL, good LS mobility, good hip AROM to allow for proper joint functioning as indicated by patients Functional Deficits. [] Progressing: [] Met: [] Not Met: [] Adjusted  3. Patient will demonstrate an increase in Strength to normal proximal hip and core activation to allow for proper functional mobility as indicated by patients Functional Deficits. [] Progressing: [] Met: [] Not Met: [] Adjusted  4.  Patient will return to baseline pain levels and functional activities per pt satisfication  [] Progressing: [] Met: [] Not Met: [] Adjusted      Electronically signed by:  Stephan Ayala, PT DPT

## 2021-11-04 NOTE — FLOWSHEET NOTE
[facility]  [address], [address]  Phone: [phone]   Fax: [phone]    Physical Therapy Treatment Note/ Progress Report:     Date:  2021    Patient Name:  Desiree Cool    :  1960  MRN: [id number]    Medical/Treatment Diagnosis Information:  · Diagnosis: M54.50 Chronic lito Low back pain without sciatica  ·  low back pain   Insurance/Certification information:   [employer]  Physician Information:  Referring Practitioner: Gianna Laird NP  Plan of care signed:    [x] Yes  [] No    Date of Patient follow up with Physician:      Progress Report: []  Yes  []  No     Date Range for reporting period:  Beginning:   Today   Ending:  todays date    Progress report due (10 Rx/or 30 days whichever is less): 4722  Recertification due (POC duration/ or 90 days whichever is less):     Visit # Insurance Allowable Auth Needed   1/  -2x 4-6 wks  []Yes   [x]No     RESTRICTIONS/PRECAUTIONS:   Latex Allergy:  [x]NO      []YES  Preferred Language for Healthcare:   [x]English       []other:  Functional Scale: Date assessed:Eval date     Pain level:  /10     SUBJECTIVE:  See eval    OBJECTIVE: See eval   Observation:    Test measurements:      Flowsheet  Interventions   Checked boxes were performed today in clinic   Resistance/Reps/Sets HEP Other comments  [] Difficulty with mirroring desired ex/body movement, diminished body mechanics/postural awareness, decreased ability to return desire movements following PT demo, Pt requires further education/training and needing close supervision of PT while exercising   [x] Therapeutic Ex ( TE)  [phone]) Provided verbal/tactile cueing for activities related to strengthening, flexibility, endurance, ROM for improvements in LE, proximal hip, and core control with self-care, mobility, lifting, ambulation. Reviewed/Progressed HEP, All questions answered  [] PT demo assisted with stretches for comfort Prone,then progressed to prone on elbows  5 mins ea []    Hamstring stretching  30s x3  []    Hip IR stretching  30s x3      Prone TA with hip IR and ER strengthening light theraband 15ea     Prone TA with AROM hip IR  30s x3            [] NMR  (60353) Provided verbal/tactile cueing for activities related to improving balance, coordination, kinesthetic sense, posture, motor skill, proprioception to assist with LE, proximal hip, and core control in self-care, mobility, lifting, ambulation and eccentric single leg control. proprioception of core, proximal hip and LE for self-care, mobility, lifting, and ambulation/stair navigation  [] Postural education and training eduation over influence of pelvic on lubmar spine and inc awareness to pelvic neutral      []           [] Therapeutic Activity ( TA)  (84713) Provided verbal/tactile cueing for activities related to improving balance, coordination, kinesthetic sense, posture, motor skill, proprioception and motor activation to allow for proper function of core, proximal hip and LE with self-care and ADLs and functional mobility.   []      []    [] Gait Training (62647) Provided training and instruction to the patient for proper LE, core and proximal hip recruitment and positioning and eccentric body weight control with ambulation re-education including up and down stairs   []      []      []      []      []      []    [x] Manual Therapy   ([address] [facility]) Provided manual therapy to mobilize LE, proximal hip and/or LS spine soft tissue/joints for the purpose of modulating pain, promoting relaxation,  increasing ROM, reducing/eliminating soft tissue swelling/inflammation/restriction, improving soft tissue extensibility and allowing for proper ROM for normal function with self-care, mobility, lifting and ambulation.      [x] Manual Therapy CONT:   PROM/AAROM STM gradual progression from light to deep pressure as aniket to mid and low back, provided Joint distraction/long axial stretching Oscillations-Mobs:  G-I, II, PA SP in lumbar spine and lower tspine liited aniket  (PA's, Inf., Post.). []      []      []    [] ES(attended) (51268)  [] ES (un) (45251)   []      []      Home Exercise Program:      Modalities:    [] Electric Stimulation:   [] Ultrasound:   [] Other:     [x] EVAL (LOW) 51067  [] EVAL (MOD) 95634  [] EVAL (HIGH) 91020   [] RE-EVAL     [x] DT(39754) x   1    [] NMR (03515) x       [x] Manual (28414) x    1   [] TA (17723) x       [] Gait Training (01018) x       [] ES(attended) (95774)  [] ES (un) (08881)   [] DRY NEEDLE 1 OR 2 MUSCLES  [] DRY NEEDLE 3+ MUSCLES  [] Mech Traction (65101)  [] Ultrasound (80527)  [] Other:  Dry Needling: Science-based intervention for the treatment of pain in which the use of fine needles are inserted into myofascial trigger points (painful muscular/soft tissues or nerves) in order to stimulate healing response.   [] 74538 PT DRY NEEDLING 1-2 MUSCLES  [] 20393 PT DRY NEEDLING 3+ MUSCLES  Comments: Consent form is located within Media tab in EPIC  o produce intramuscular mobilization. These techniques were used to restore functional range of motion, reduce muscle spasm and induce healing in the corresponding musculature. (81832)  Clean Technique was utilized today while applying Dry needling treatment. The treatment sites where cleaned with 70% solution of  isopropyl alcohol . The PT washed their hands and utilized treatment gloves along with hand  prior to inserting the needles. All needles where removed and discarded in the appropriate sharps container. Charges:  Timed Code Treatment Minutes: 20 mins low complex, 15 min man, 15 mins TE   Total Treatment Minutes: 50     GOALS:   Patient stated goal: 3  []? Progressing: []? Met: []? Not Met: []? Adjusted  Therapist goals for Patient:   Short Term Goals: To be achieved in: 2 weeks  1. Independent in HEP and progression per patient tolerance, in order to prevent re-injury. []? Progressing: []? Met: []? Not Met: []? Adjusted  2. Patient will have a decrease in pain to facilitate improvement in movement, function, and ADLs as indicated by Functional Deficits. []? Progressing: []? Met: []? Not Met: []? Adjusted     Long Term Goals: To be achieved in: 6 weeks  1. Disability index score of 5/50 from 16/50 per DONI to assist with reaching prior level of function. []? Progressing: []? Met: []? Not Met: []? Adjusted  2. Patient will demonstrate increased AROM to WNL, good LS mobility, good hip AROM to allow for proper joint functioning as indicated by patients Functional Deficits. []? Progressing: []? Met: []? Not Met: []? Adjusted  3. Patient will demonstrate an increase in Strength to normal proximal hip and core activation to allow for proper functional mobility as indicated by patients Functional Deficits. []? Progressing: []? Met: []? Not Met: []? Adjusted  4. Patient will return to baseline pain levels and functional activities per pt satisfication  []? Progressing: []? Met: []? Not Met: []? Adjusted    ASSESSMENT:  See eval      Treatment/Activity Tolerance:  [x] Patient tolerated treatment well [] Patient limited by fatique  [] Patient limited by pain  [] Patient limited by other medical complications  [] Other:     Overall Progression Towards Functional goals/ Treatment Progress Update:  [] Patient is progressing as expected towards functional goals listed. [] Progression is slowed due to complexities/Impairments listed. [] Progression has been slowed due to co-morbidities.   [x] Plan just implemented, too soon to assess goals progression <30days   [] Goals require adjustment due to lack of progress  [] Patient is not progressing as expected and requires additional follow up with physician  [] Other    Prognosis for POC: [x] Good [] Fair  [] Poor    Patient requires continued skilled intervention: [x] Yes  [] No        PLAN: See eval  [x] Continue per plan of care [] Alter current plan (see comments)  [x] Plan of care initiated [] Hold pending MD visit [] Discharge    Electronically signed by: Zev Qureshi PT DPT    Note: If patient does not return for scheduled/recommended follow up visits, this note will serve as a discharge from care along with the most recent update on progress.

## 2021-11-08 ENCOUNTER — APPOINTMENT (OUTPATIENT)
Dept: PHYSICAL THERAPY | Age: 61
End: 2021-11-08
Payer: COMMERCIAL

## 2021-11-11 ENCOUNTER — HOSPITAL ENCOUNTER (OUTPATIENT)
Dept: PHYSICAL THERAPY | Age: 61
Setting detail: THERAPIES SERIES
Discharge: HOME | End: 2021-11-11
Payer: COMMERCIAL

## 2021-11-11 PROCEDURE — 97140 MANUAL THERAPY 1/> REGIONS: CPT

## 2021-11-11 PROCEDURE — 97032 APPL MODALITY 1+ESTIM EA 15: CPT

## 2021-11-11 PROCEDURE — 97110 THERAPEUTIC EXERCISES: CPT

## 2021-11-11 NOTE — FLOWSHEET NOTE
[facility] Outpatient Therapy  [address] [place] [address], [address], [address]  Phone: [phone]   Fax: [phone]    Physical Therapy Treatment Note/ Progress Report:     Date:  2021    Patient Name:  Zulema Cool    :  1960  MRN: [id number]    Medical/Treatment Diagnosis Information:  · Diagnosis: M54.50 Chronic lito Low back pain without sciatica  ·  low back pain   Insurance/Certification information:   [employer]  Physician Information:     Plan of care signed:    [x] Yes  [] No    Date of Patient follow up with Physician:      Progress Report: []  Yes  []  No     Date Range for reporting period:  Beginning:     Ending:  todays date    Progress report due (10 Rx/or 30 days whichever is less):   Recertification due (POC duration/ or 90 days whichever is less):     Visit # Insurance Allowable Auth Needed   2/  1-2x 4-6 wks  []Yes   [x]No     RESTRICTIONS/PRECAUTIONS:   Latex Allergy:  [x]NO      []YES  Preferred Language for Healthcare:   [x]English       []other:  Functional Scale: Date assessed:Eval date     Pain level: 0 /10 denies pain, stiffness 3/10 upon arrival,      SUBJECTIVE:  Max pain 5-6/10, mainly in the mornings, complicated by recent ankle surgery and ongoing stiffness weakness and pain in the left LE reports feeling it has changed how he walks and feels to have contributed to inc pain in back.      OBJECTIVE:    Observation:    Test measurements:        Flowsheet    Interventions   Checked boxes were performed today in clinic   Resistance/Reps/Sets HEP Other comments  [] Difficulty with mirroring desired ex/body movement, diminished body mechanics/postural awareness, decreased ability to return desire movements following PT demo, Pt requires further education/training and needing close supervision of PT while exercising   [x] Therapeutic Ex ( TE)  (31258) Provided verbal/tactile cueing for activities related to strengthening, flexibility, endurance, ROM for improvements in LE, proximal hip, and core control with self-care, mobility, lifting, ambulation. Reviewed/Progressed HEP, All questions answered  []    Prone,then progressed to prone on elbows  5 mins ea []    Hamstring stretching  30s x3  []    Hip IR stretching  30s x3      Prone TA with hip IR and ER strengthening light theraband 15ea     Prone TA with AROM hip IR  30s x3      Single knee to chest  10s 10 reps      DKTC 10s x 10 reps      LE rotation 10s x 10 reps      [] NMR  (99739) Provided verbal/tactile cueing for activities related to improving balance, coordination, kinesthetic sense, posture, motor skill, proprioception to assist with LE, proximal hip, and core control in self-care, mobility, lifting, ambulation and eccentric single leg control.  proprioception of core, proximal hip and LE for self-care, mobility, lifting, and ambulation/stair navigation  [] Postural education and training eduation over influence of pelvic on lubmar spine and inc awareness to pelvic neutral     [] Therapeutic Activity ( TA)  (02530) Provided verbal/tactile cueing for activities related to improving balance, coordination, kinesthetic sense, posture, motor skill, proprioception and motor activation to allow for proper function of core, proximal hip and LE with self-care and ADLs and functional mobility.   []      []    [] Gait Training (76669) Provided training and instruction to the patient for proper LE, core and proximal hip recruitment and positioning and eccentric body weight control with ambulation re-education including up and down stairs   []      []      []      []      []      []    [] Manual Therapy   ([id number]) Provided manual therapy to mobilize LE, proximal hip and/or LS spine soft tissue/joints for the purpose of modulating pain, promoting relaxation,  increasing ROM, reducing/eliminating soft tissue swelling/inflammation/restriction, improving soft tissue extensibility and allowing for proper ROM for normal function with self-care, mobility, lifting and ambulation. [] Manual Therapy CONT:   PROM/AAROM STM gradual progression from light to deep pressure as aniket to mid and low back, provided Joint distraction/long axial stretching Oscillations-Mobs:  G-I, II, PA SP in lumbar spine and lower t-spine liited aniket  (PA's, Inf., Post.). ISTM- pro bevel down 25-45 degree angles for trigger point release- scanning fanning, j strokes, brushing strumming musculatures to address soft tissue imbalances      []      []      []    [x] ES(attended) (51229)  [] ES (un) (18770)  [x] Electric Stimulation:  IFC E stim low back with MHP  []      []        [x] AT(87156) x   1    [] NMR (48709) x       [x] Manual (68381) x    1   [] TA (27279) x       [] Gait Training ([id number]) x       [] ES(attended) (52918)  [] ES (un) (42434)   [] DRY NEEDLE 1 OR 2 MUSCLES  [] DRY NEEDLE 3+ MUSCLES  [] [facility] Traction (56365)  [] Ultrasound (51489)  [] Other:  Dry Needling: Science-based intervention for the treatment of pain in which the use of fine needles are inserted into myofascial trigger points (painful muscular/soft tissues or nerves) in order to stimulate healing response.   [] 76650 PT DRY NEEDLING 1-2 MUSCLES  [] 35752 PT DRY NEEDLING 3+ MUSCLES  Comments: Consent form is located within Media tab in EPIC  o produce intramuscular mobilization. These techniques were used to restore functional range of motion, reduce muscle spasm and induce healing in the corresponding musculature. (17371)  Clean Technique was utilized today while applying Dry needling treatment. The treatment sites where cleaned with 70% solution of  isopropyl alcohol . The PT washed their hands and utilized treatment gloves along with hand  prior to inserting the needles. All needles where removed and discarded in the appropriate sharps container.       Charges:  Timed Code Treatment Minutes: 20 min E stim, 25 mins TE, 8 mins Man Total Treatment Minutes: 48     GOALS:   Patient stated goal: 3  []? Progressing: []? Met: []? Not Met: []? Adjusted  Therapist goals for Patient:   Short Term Goals: To be achieved in: 2 weeks  1. Independent in HEP and progression per patient tolerance, in order to prevent re-injury. []? Progressing: []? Met: []? Not Met: []? Adjusted  2. Patient will have a decrease in pain to facilitate improvement in movement, function, and ADLs as indicated by Functional Deficits. []? Progressing: []? Met: []? Not Met: []? Adjusted     Long Term Goals: To be achieved in: 6 weeks  1. Disability index score of 5/50 from 16/50 per DONI to assist with reaching prior level of function. []? Progressing: []? Met: []? Not Met: []? Adjusted  2. Patient will demonstrate increased AROM to WNL, good LS mobility, good hip AROM to allow for proper joint functioning as indicated by patients Functional Deficits. []? Progressing: []? Met: []? Not Met: []? Adjusted  3. Patient will demonstrate an increase in Strength to normal proximal hip and core activation to allow for proper functional mobility as indicated by patients Functional Deficits. []? Progressing: []? Met: []? Not Met: []? Adjusted  4. Patient will return to baseline pain levels and functional activities per pt satisfication  []? Progressing: []? Met: []? Not Met: []? Adjusted    ASSESSMENT:  Reducing complaints of pain, c/c stiffness, ongoing education over home modalities handout given, rec TENS for home as well as he responded well today with IFC. Cont to benefit from manual work as well for dec complaints and skilled PT for progression of HEP exs.  Cont with POC      Treatment/Activity Tolerance:  [x] Patient tolerated treatment well [] Patient limited by fatique  [] Patient limited by pain  [] Patient limited by other medical complications  [] Other:     Overall Progression Towards Functional goals/ Treatment Progress Update:  [] Patient is progressing as expected towards functional goals listed. [] Progression is slowed due to complexities/Impairments listed. [] Progression has been slowed due to co-morbidities. [x] Plan just implemented, too soon to assess goals progression <30days   [] Goals require adjustment due to lack of progress  [] Patient is not progressing as expected and requires additional follow up with physician  [] Other    Prognosis for POC: [x] Good [] Fair  [] Poor    Patient requires continued skilled intervention: [x] Yes  [] No        PLAN: See eval  [x] Continue per plan of care [] Alter current plan (see comments)  [x] Plan of care initiated [] Hold pending MD visit [] Discharge    Electronically signed by: Analy Nunez PT DPT    Note: If patient does not return for scheduled/recommended follow up visits, this note will serve as a discharge from care along with the most recent update on progress.

## 2021-11-15 ENCOUNTER — HOSPITAL ENCOUNTER (OUTPATIENT)
Dept: PHYSICAL THERAPY | Age: 61
Setting detail: THERAPIES SERIES
Discharge: HOME | End: 2021-11-15
Payer: COMMERCIAL

## 2021-11-15 PROCEDURE — 97110 THERAPEUTIC EXERCISES: CPT

## 2021-11-15 PROCEDURE — G0283 ELEC STIM OTHER THAN WOUND: HCPCS

## 2021-11-15 PROCEDURE — 97140 MANUAL THERAPY 1/> REGIONS: CPT

## 2021-11-15 NOTE — FLOWSHEET NOTE
[facility] Outpatient [facility]  [address] [place] [address], [address]  Phone: [phone]   Fax: [phone]    Physical Therapy Treatment Note/ Progress Report:     Date:  11/15/2021    Patient Name:  Lorraine Cool    :  1960  MRN: [id number]    Medical/Treatment Diagnosis Information:  · Medical Diagnosis: M54.50 Chronic lito Low back pain without sciatica  · Treatment Dx: low back pain   Insurance/Certification information:   [facility]  Physician Information:   Lima Kruger NP  Plan of care signed:    [x] Yes  [] No  Date of Patient follow up with Physician:  Dec ? 2021     Progress Report: []  Yes  []  No     Date Range for reporting period:  Beginning:  Eval Today   Ending:  todays date    Progress report due (10 Rx/or 30 days whichever is less):   Recertification due (POC duration/ or 90 days whichever is less):     Visit # Insurance Allowable Auth Needed   3/  1-2x 4-6 wks  []Yes   [x]No     RESTRICTIONS/PRECAUTIONS:   Latex Allergy:  [x]NO      []YES  Preferred Language for Healthcare:   [x]English       []other:  Functional Scale: Date assessed:Eval date     Pain level: 0 /10 denies pain, stiffness 3/10 upon arrival,  1/10 post session     SUBJECTIVE:  Pt reports less pain overall, reports was able to complete yard work with less pain, compliant with stretching and heat and ice as instructed feels that helps. Pt does complaint more about c spine and thoracic spine today but reports the complaints are mild stiffness in right side of spine c/c today. reports is getting a TENs he ordered online. Reports sleeping is normalizing as well.        OBJECTIVE:     Observation:    Test measurements:    lito hamstring tightness noted with 90/90, right hip IR and er MOST LIMITED COMPARED TO THE LLE, right paraspinals more pronounced [id number] to the left and in the mid to lower thoracic spine     Flowsheet    Interventions   Checked boxes were performed today in clinic   Resistance/Reps/Sets HEP Other comments  [] Difficulty with mirroring desired ex/body movement, diminished body mechanics/postural awareness, decreased ability to return desire movements following PT demo, Pt requires further education/training and needing close supervision of PT while exercising   [x] Therapeutic Ex ( TE)  (31240) Provided verbal/tactile cueing for activities related to strengthening, flexibility, endurance, ROM for improvements in LE, proximal hip, and core control with self-care, mobility, lifting, ambulation. Reviewed/Progressed HEP, All questions answered  [] PT assisted with thereapeutic ex for comfort and over pressure during stretching    Prone  10 mins []    Hamstring stretching  30s x3  []    Hip IR stretching  30s x3      Piriformis stretching  30s x 3            Single knee to chest  10s 10 reps      DKTC 10s x 10 reps      LE rotation 10s x 10 reps      [] NMR  (06025) Provided verbal/tactile cueing for activities related to improving balance, coordination, kinesthetic sense, posture, motor skill, proprioception to assist with LE, proximal hip, and core control in self-care, mobility, lifting, ambulation and eccentric single leg control.  proprioception of core, proximal hip and LE for self-care, mobility, lifting, and ambulation/stair navigation  [] Postural education and training eduation over influence of pelvic on lubmar spine and inc awareness to pelvic neutral     [] Therapeutic Activity ( TA)  (35450) Provided verbal/tactile cueing for activities related to improving balance, coordination, kinesthetic sense, posture, motor skill, proprioception and motor activation to allow for proper function of core, proximal hip and LE with self-care and ADLs and functional mobility.   []      []    [] Gait Training (94368) Provided training and instruction to the patient for proper LE, core and proximal hip recruitment and positioning and eccentric body weight control with ambulation healing in the corresponding musculature. (20108)  Clean Technique was utilized today while applying Dry needling treatment. The treatment sites where cleaned with 70% solution of  isopropyl alcohol . The PT washed their hands and utilized treatment gloves along with hand  prior to inserting the needles. All needles where removed and discarded in the appropriate sharps container. Charges:  Timed Code Treatment Minutes: 20 min E stim, 20 mins TE, 25 mins Man   Total Treatment Minutes: 65     GOALS:   Patient stated goal: 3  []? Progressing: []? Met: []? Not Met: []? Adjusted  Therapist goals for Patient:   Short Term Goals: To be achieved in: 2 weeks  1. Independent in HEP and progression per patient tolerance, in order to prevent re-injury. []? Progressing: []? Met: []? Not Met: []? Adjusted  2. Patient will have a decrease in pain to facilitate improvement in movement, function, and ADLs as indicated by Functional Deficits. []? Progressing: []? Met: []? Not Met: []? Adjusted     Long Term Goals: To be achieved in: 6 weeks  1. Disability index score of 5/50 from 16/50 per DONI to assist with reaching prior level of function. []? Progressing: []? Met: []? Not Met: []? Adjusted  2. Patient will demonstrate increased AROM to WNL, good LS mobility, good hip AROM to allow for proper joint functioning as indicated by patients Functional Deficits. []? Progressing: []? Met: []? Not Met: []? Adjusted  3. Patient will demonstrate an increase in Strength to normal proximal hip and core activation to allow for proper functional mobility as indicated by patients Functional Deficits. []? Progressing: []? Met: []? Not Met: []? Adjusted  4. Patient will return to baseline pain levels and functional activities per pt satisfication  []? Progressing: []? Met: []? Not Met: []?  Adjusted    ASSESSMENT:  Reducing complaints of pain, c/c stiffness moving more in mid an dc spine vs low back today, reduced complaints post session. Cont to benefit from manual work as well for dec complaints and skilled PT for progression of HEP exs. Cont with POC      Treatment/Activity Tolerance:  [x] Patient tolerated treatment well [] Patient limited by fatique  [] Patient limited by pain  [] Patient limited by other medical complications  [] Other:     Overall Progression Towards Functional goals/ Treatment Progress Update:  [] Patient is progressing as expected towards functional goals listed. [] Progression is slowed due to complexities/Impairments listed. [] Progression has been slowed due to co-morbidities. [x] Plan just implemented, too soon to assess goals progression <30days   [] Goals require adjustment due to lack of progress  [] Patient is not progressing as expected and requires additional follow up with physician  [] Other    Prognosis for POC: [x] Good [] Fair  [] Poor    Patient requires continued skilled intervention: [x] Yes  [] No        PLAN: See eval  [x] Continue per plan of care [] Alter current plan (see comments)  [x] Plan of care initiated [] Hold pending MD visit [] Discharge    Electronically signed by: Juan Banks PT DPT    Note: If patient does not return for scheduled/recommended follow up visits, this note will serve as a discharge from care along with the most recent update on progress.

## 2021-11-18 ENCOUNTER — HOSPITAL ENCOUNTER (OUTPATIENT)
Dept: PHYSICAL THERAPY | Age: 61
Setting detail: THERAPIES SERIES
Discharge: HOME | End: 2021-11-18
Payer: COMMERCIAL

## 2021-11-18 PROCEDURE — G0283 ELEC STIM OTHER THAN WOUND: HCPCS

## 2021-11-18 PROCEDURE — 97140 MANUAL THERAPY 1/> REGIONS: CPT

## 2021-11-18 PROCEDURE — 97110 THERAPEUTIC EXERCISES: CPT

## 2021-11-18 NOTE — FLOWSHEET NOTE
[facility] Outpatient Therapy  [address]  [employer], [address] [place] [address]  Phone: [phone]   Fax: [phone]    Physical Therapy Treatment Note/ Progress Report:     Date:  2021    Patient Name:  Robbie Cool    :  1960  MRN: [id number]    Medical/Treatment Diagnosis Information:  · Medical Diagnosis: M54.50 Chronic lito Low back pain without sciatica  · Treatment Dx: low back pain   Insurance/Certification information:   Lane Rodarte  Physician Information:   Janessa Brennan NP  Plan of care signed:    [x] Yes  [] No  Date of Patient follow up with Physician:  Dec ? 2021     Progress Report: []  Yes  []  No     Date Range for reporting period:  Beginning:   Today   Ending:  todays date    Progress report due (10 Rx/or 30 days whichever is less):   Recertification due (POC duration/ or 90 days whichever is less):     Visit # Insurance Allowable Auth Needed   4/  -2x 4-6 wks  []Yes   [x]No     RESTRICTIONS/PRECAUTIONS:   Latex Allergy:  [x]NO      []YES  Preferred Language for Healthcare:   [x]English       []other:  Functional Scale: Date assessed:Eval date     Pain level: 1/10 denies pain, stiffness 3/10 upon arrival,      SUBJECTIVE:  Feeling better      OBJECTIVE:     Observation:    Test measurements:    lito hamstring tightness noted with 90/90, right hip IR and er MOST LIMITED COMPARED TO THE LLE, right paraspinals more pronounced [id number] to the left and in the mid to lower thoracic spine     Flowsheet    Interventions   Checked boxes were performed today in clinic   Resistance/Reps/Sets HEP Other comments  [] Difficulty with mirroring desired ex/body movement, diminished body mechanics/postural awareness, decreased ability to return desire movements following PT demo, Pt requires further education/training and needing close supervision of PT while exercising   [x] Therapeutic Ex ( TE)  (98689) Provided verbal/tactile cueing for activities related to strengthening, flexibility, endurance, ROM for improvements in LE, proximal hip, and core control with self-care, mobility, lifting, ambulation. Reviewed/Progressed HEP, All questions answered  [] PT assisted with thereapeutic ex for comfort and over pressure during stretching    Prone  10 mins []    Hamstring stretching  30s x3  []    Hip IR stretching  30s x3      Piriformis stretching  30s x 3            Single knee to chest  10s 10 reps      DKTC 10s x 10 reps      LE rotation 10s x 10 reps      [] NMR  (25279) Provided verbal/tactile cueing for activities related to improving balance, coordination, kinesthetic sense, posture, motor skill, proprioception to assist with LE, proximal hip, and core control in self-care, mobility, lifting, ambulation and eccentric single leg control.  proprioception of core, proximal hip and LE for self-care, mobility, lifting, and ambulation/stair navigation  [] Postural education and training eduation over influence of pelvic on lubmar spine and inc awareness to pelvic neutral     [] Therapeutic Activity ( TA)  (25007) Provided verbal/tactile cueing for activities related to improving balance, coordination, kinesthetic sense, posture, motor skill, proprioception and motor activation to allow for proper function of core, proximal hip and LE with self-care and ADLs and functional mobility.   []      []    [] Gait Training (42871) Provided training and instruction to the patient for proper LE, core and proximal hip recruitment and positioning and eccentric body weight control with ambulation re-education including up and down stairs   []      []      []      []      []      []    [x] Manual Therapy   (26829) Provided manual therapy to mobilize LE, proximal hip and/or LS spine soft tissue/joints for the purpose of modulating pain, promoting relaxation,  increasing ROM, reducing/eliminating soft tissue swelling/inflammation/restriction, improving soft tissue extensibility and allowing for proper ROM for normal function with self-care, mobility, lifting and ambulation. [x] Manual Therapy CONT:   PROM/AAROM STM gradual progression from light to deep pressure as aniket to mid and low back, provided Joint distraction/long axial stretching Oscillations-Mobs:  G-I, II, PA SP in lumbar spine and lower t-spine liited aniket  (PA's, Inf., Post.). ISTM- pro bevel down 25-45 degree angles for trigger point release- scanning fanning, j strokes, brushing strumming musculatures to address soft tissue imbalances    Supine long axial distraction SL 60s x 2, lito distraction with mob belt 30s x 3  ISTM to paraspinals  []      []      []    [x] ES(attended) (15583)  [] ES (un) (87219)  [x] Electric Stimulation:  IFC E stim low back  []      []        [x] UX(33879) x   1    [] NMR (71545) x       [x] Manual (80398) x    2   [] TA (88416) x       [] Gait Training (21829) x       [] ES(attended) (05708)  [x] ES (un) (50478)   [] DRY NEEDLE 1 OR 2 MUSCLES  [] DRY NEEDLE 3+ MUSCLES  [] [facility] Traction (50285)  [] Ultrasound (91835)  [] Other:  Dry Needling: Science-based intervention for the treatment of pain in which the use of fine needles are inserted into myofascial trigger points (painful muscular/soft tissues or nerves) in order to stimulate healing response.   [] 33942 PT DRY NEEDLING 1-2 MUSCLES  [] 90368 PT DRY NEEDLING 3+ MUSCLES  Comments: Consent form is located within Media tab in EPIC  o produce intramuscular mobilization. These techniques were used to restore functional range of motion, reduce muscle spasm and induce healing in the corresponding musculature. (04654)  Clean Technique was utilized today while applying Dry needling treatment. The treatment sites where cleaned with 70% solution of  isopropyl alcohol . The PT washed their hands and utilized treatment gloves along with hand  prior to inserting the needles. All needles where removed and discarded in the appropriate sharps container. Charges:  Timed Code Treatment Minutes: 20 min E stim, 20 mins TE, 25 mins Man   Total Treatment Minutes: 65     GOALS:   Patient stated goal: 3  []? Progressing: []? Met: []? Not Met: []? Adjusted  Therapist goals for Patient:   Short Term Goals: To be achieved in: 2 weeks  1. Independent in HEP and progression per patient tolerance, in order to prevent re-injury. []? Progressing: []? Met: []? Not Met: []? Adjusted  2. Patient will have a decrease in pain to facilitate improvement in movement, function, and ADLs as indicated by Functional Deficits. []? Progressing: []? Met: []? Not Met: []? Adjusted     Long Term Goals: To be achieved in: 6 weeks  1. Disability index score of 5/50 from 16/50 per DONI to assist with reaching prior level of function. []? Progressing: []? Met: []? Not Met: []? Adjusted  2. Patient will demonstrate increased AROM to WNL, good LS mobility, good hip AROM to allow for proper joint functioning as indicated by patients Functional Deficits. []? Progressing: []? Met: []? Not Met: []? Adjusted  3. Patient will demonstrate an increase in Strength to normal proximal hip and core activation to allow for proper functional mobility as indicated by patients Functional Deficits. []? Progressing: []? Met: []? Not Met: []? Adjusted  4. Patient will return to baseline pain levels and functional activities per pt satisfication  []? Progressing: []? Met: []? Not Met: []? Adjusted    ASSESSMENT:  Reducing complaints of pain, c/c stiffness moving more in mid an dc spine vs low back today, reduced complaints post session. Cont to benefit from manual work as well for dec complaints and skilled PT for progression of HEP exs.  Cont with POC      Treatment/Activity Tolerance:  [x] Patient tolerated treatment well [] Patient limited by fatique  [] Patient limited by pain  [] Patient limited by other medical complications  [] Other:     Overall Progression Towards Functional goals/ Treatment Progress Update:  [] Patient is progressing as expected towards functional goals listed. [] Progression is slowed due to complexities/Impairments listed. [] Progression has been slowed due to co-morbidities. [x] Plan just implemented, too soon to assess goals progression <30days   [] Goals require adjustment due to lack of progress  [] Patient is not progressing as expected and requires additional follow up with physician  [] Other    Prognosis for POC: [x] Good [] Fair  [] Poor    Patient requires continued skilled intervention: [x] Yes  [] No        PLAN: See eval  [x] Continue per plan of care [] Alter current plan (see comments)  [x] Plan of care initiated [] Hold pending MD visit [] Discharge    Electronically signed by: Sherron Ponce PT DPT    Note: If patient does not return for scheduled/recommended follow up visits, this note will serve as a discharge from care along with the most recent update on progress.

## 2021-11-22 ENCOUNTER — HOSPITAL ENCOUNTER (OUTPATIENT)
Dept: PHYSICAL THERAPY | Age: 61
Setting detail: THERAPIES SERIES
Discharge: HOME | End: 2021-11-22
Payer: COMMERCIAL

## 2021-11-22 PROCEDURE — 97110 THERAPEUTIC EXERCISES: CPT

## 2021-11-22 NOTE — DISCHARGE SUMMARY
[facility] Outpatient Therapy  [address] [place] [address] [address], [address]  Phone: [phone]   Fax: [phone]    Physical Therapy Discharge Summary:     Date:  2021    Patient Name:  Farhat Mariee    :  1960  MRN: [id number]    Eval Date: 2021  Discharge Date: 2021  Referring Provider: Kierra Esquivel NP  · Medical Diagnosis (with ICD-10):  Medical Diagnosis: M54.50 Chronic lito Low back pain without sciatica  · Treatment Diagnosis: low back pain       Comments:    [] Pt did not adhere to Plan of Care/did not return for follow-up visits. Cancels/No-shows to Date: 0    Plan of Care/Treatment Used to Date:  [x] Therapeutic Exercise  [x] Therapeutic Activity   [] Gait Training  [x] Neuromuscular Re-education  [x] Manual Therapy  [x] Modalities:         [] Ultrasound  [x] Electrical Stimulation            [] Iontophoresis [] Mechanical Traction           Pain at Eval: 8-9/10  Pain at DC:  0/10 denies pain     Functional Outcome Used:    [] LEFS   [] ABC Scale  [] UEFI/UEFS   [] Functional Gait Index  [x] Modified Oswestry  [] URRUTIA  [] NDI    [] LLIS  [] [facility] [place] [facility]    [] Other:     Initial Score (% disability):  32  Discharge Score (% disability):  10    Episode of Care:   # Visits:  5  Duration (# Weeks):  3    Treatment Approach:  [] Surgical  [x] Conservative    Special Certifications/Equipment Used (check all that apply):  [] Lymphedema  [] LSVT  [] OMT-C   [] Women's Health  [] Vestibular   [] Music Treadmill  [] Dry Needling  [] CHT  [x] Other: Manual   Co-morbidities/Complexities:   # of relevant co-morbidities:  57 y/o male,    []None     [x]Other:   Prostatic CA  BMI 33.13kg/m2 []Covid-19   Arthritic conditions   []Rheumatoid arthritis (M05.9)  []Osteoarthritis (M19.91)   Cardiovascular conditions   []Hypertension (I10)  []Hyperlipidemia (E78.5)  []Angina pectoris (I20)  []Atherosclerosis (I70)   Musculoskeletal conditions   [x]Disc pathology []Congenital spine pathologies   []Prior surgical intervention  []Osteoporosis (M81.8)  []Osteopenia (M85.8)   Endocrine conditions   []Hypothyroid (E03.9)  []Hyperthyroid Gastrointestinal conditions   []Constipation (K85.81)   Metabolic conditions   []Morbid obesity (E66.01)  []Obesity (E66)  []Diabetes type 1(E10.65)  []Diabetes type 2 (E11.65)  []Neuropathy (G60.9)   Pulmonary conditions   []Asthma (J45)  []Coughing   []COPD (J44.9) Psychological Disorders  []Anxiety (F41.9)  []Depression (F32.9)   []Bipolar (F31.9) Neurological conditions  []CVA (I69)  []Parkinsons (G20)  []Other:         Significant findings at last visit:  Resolved pain, reports stiffness mild in the morning c/c and after prolong standing or walking   Normalizing AROM in hips and good hamstring length  Satisfied with care and progress in PT    Progress towards goals:    GOALS:   Patient stated goal: reduce pain   []? ? Progressing: [x]? ? Met: []?? Not Met: []?? Adjusted  Therapist goals for Patient:   Short Term Goals: To be achieved in: 2 weeks  1. Independent in HEP and progression per patient tolerance, in order to prevent re-injury. []?? Progressing: [x]? ? Met: []?? Not Met: []?? Adjusted  2. Patient will have a decrease in pain to less than 4/10 at worse facilitate improvement in movement, function, and ADLs as indicated by Functional Deficits. []?? Progressing: [x]? ? Met: []?? Not Met: []?? Adjusted     Long Term Goals: To be achieved in: 6 weeks  1. Disability index score of 5/50 from 16/50 per DONI to assist with reaching prior level of function.   []?? Progressing: [x]? ? Met: []?? Not Met: []?? Adjusted  2. Patient will demonstrate increased AROM to WNL, good LS mobility, good hip AROM to allow for proper joint functioning as indicated by patients Functional Deficits. []?? Progressing: [x]? ? Met: []?? Not Met: []?? Adjusted  3.  Patient will demonstrate an increase in Strength to normal proximal hip and core activation to allow for proper functional mobility as indicated by patients Functional Deficits. []?? Progressing: [x]? ? Met: []?? Not Met: []?? Adjusted  4. Patient will return to baseline pain levels and functional activities per pt satisfication  []? ? Progressing: [x]? ? Met: []?? Not Met: []?? Adjusted  Goal Status: [x] Achieved [] Partially Achieved  [] Not Achieved  Patient Status: [x] Patient now discharged    Thank you for this referral  Electronically signed by:  Cheyanne Navarrete, PT, DPT

## 2021-11-22 NOTE — FLOWSHEET NOTE
[facility] Outpatient Therapy  [address] [place] [address], [address], [address]  Phone: [phone]   Fax: [phone]    Physical Therapy Treatment Note/ Progress Report:     Date:  2021    Patient Name:  Keena Cool    :  1960  MRN: [id number]    Medical/Treatment Diagnosis Information:  · Medical Diagnosis: M54.50 Chronic lito Low back pain without sciatica  · Treatment Dx: low back pain   Insurance/Certification information: Michelle Harman 150  Physician Information:  Thi Delvalle NP  Plan of care signed:    [x] Yes  [] No  Date of Patient follow up with Physician:  Dec ? 2021     Progress Report: []  Yes  []  No     Date Range for reporting period:  Beginning:  Eval Today   Endin2021    Progress report due (10 Rx/or 30 days whichever is less):   Recertification due (POC duration/ or 90 days whichever is less):     Visit # Insurance Allowable Auth Needed     1-2x 4-6 wks  []Yes   [x]No     RESTRICTIONS/PRECAUTIONS:   Latex Allergy:  [x]NO      []YES  Preferred Language for Healthcare:   [x]English       []other:  Functional Scale: DONI   Date assessed:     Pain level: 0 /10 denies pain, stiffness 1/10 upon arrival,  1/10 post session     SUBJECTIVE:    Satisfied with care progress feels his goals have been met, denies questions over exs home modalities or body mechanics     OBJECTIVE:     Observation:    Test measurements:    Normalizing AROM in lito hips, good hamstring length   Core weakness ongoing     Flowsheet    Interventions   Checked boxes were performed today in clinic   Resistance/Reps/Sets HEP Other comments  [] Difficulty with mirroring desired ex/body movement, diminished body mechanics/postural awareness, decreased ability to return desire movements following PT demo, Pt requires further education/training and needing close supervision of PT while exercising   [x] Therapeutic Ex ( TE)  (24204) Provided verbal/tactile cueing for activities related to strengthening, flexibility, endurance, ROM for improvements in LE, proximal hip, and core control with self-care, mobility, lifting, ambulation. Reviewed/Progressed HEP, All questions answered  [] PT assisted with thereapeutic ex for comfort and over pressure during stretching    Prone hip IR   Heel squeezes TA 10s 10 reps []    Prone hip IR TB light resistance  10s x 10 reps  []    Mountain climber 10 reps x 2 sets [x]    Kneeling plank with scap retraction/protraction  10s x 5-10 reps  [x]    Plank table scap retract/protract 10s  5-10 reps  [x]    SL plank  10s x 5-10reps [x]    SL reverse clam 10 reps x 2 sets []    Hamstring stretch  30s x 3 lito      Piriformis stretching  30s x 3 lito      SKTC  10s x 10 reps      HEP:   Access Code: [email or web address]  URL: [email or web address]  Date: 11/22/2021  Prepared by: [facility] on Counter - 1 x daily - 3 x weekly - 1-3 sets - 10 reps  Sidelying Forearm Plank with Knees Bent - 1 x daily - 3 x weekly - 1-3 sets - 5-10 reps - 10s hold  Plank on Knees - 1 x daily - 3 x weekly - 1-3 sets - 5-10 reps - 10s hold  Plank on Table with Scapular Protraction Retraction - 1 x daily - 3 x weekly - 1-3 sets - 10 reps  Reverse Clamshell in Extension and Abduction - 1 x daily - 3 x weekly - 1-3 sets - 10 reps    [x] TR(20586) x   3    [] NMR (55265) x       [] Manual (42302) x       [] TA (64661) x       [] Gait Training [phone] x       [] ES(attended) (97904)  [] ES (un) (60844)   [] DRY NEEDLE 1 OR 2 MUSCLES  [] DRY NEEDLE 3+ MUSCLES  [] [facility] Traction (09523)  [] Ultrasound (48757)  [] Other:  Dry Needling: Science-based intervention for the treatment of pain in which the use of fine needles are inserted into myofascial trigger points (painful muscular/soft tissues or nerves) in order to stimulate healing response.   [] 73816 PT DRY NEEDLING 1-2 MUSCLES  [] 95785 PT DRY NEEDLING 3+ MUSCLES  Comments: Consent form is located within Media tab in EPIC  o produce intramuscular mobilization. These techniques were used to restore functional range of motion, reduce muscle spasm and induce healing in the corresponding musculature. (55740)  Clean Technique was utilized today while applying Dry needling treatment. The treatment sites where cleaned with 70% solution of  isopropyl alcohol . The PT washed their hands and utilized treatment gloves along with hand  prior to inserting the needles. All needles where removed and discarded in the appropriate sharps container. Charges:  Timed Code Treatment Minutes: 40 TE   Total Treatment Minutes: 40      GOALS:   Patient stated goal: reduce pain   []? Progressing: [x]? Met: []? Not Met: []? Adjusted  Therapist goals for Patient:   Short Term Goals: To be achieved in: 2 weeks  1. Independent in HEP and progression per patient tolerance, in order to prevent re-injury. []? Progressing: [x]? Met: []? Not Met: []? Adjusted  2. Patient will have a decrease in pain to less than 4/10 at worse facilitate improvement in movement, function, and ADLs as indicated by Functional Deficits. []? Progressing: [x]? Met: []? Not Met: []? Adjusted     Long Term Goals: To be achieved in: 6 weeks  1. Disability index score of 5/50 from 16/50 per DONI to assist with reaching prior level of function. []? Progressing: [x]? Met: []? Not Met: []? Adjusted  2. Patient will demonstrate increased AROM to WNL, good LS mobility, good hip AROM to allow for proper joint functioning as indicated by patients Functional Deficits. []? Progressing: [x]? Met: []? Not Met: []? Adjusted  3. Patient will demonstrate an increase in Strength to normal proximal hip and core activation to allow for proper functional mobility as indicated by patients Functional Deficits. []? Progressing: [x]? Met: []? Not Met: []? Adjusted  4. Patient will return to baseline pain levels and functional activities per pt satisfication  []? Progressing: [x]?  Met: []? Not Met: []? Adjusted    ASSESSMENT:  Pt and therapy goals met, satisfied with care and progress. D/C'd with progressive HEP. Treatment/Activity Tolerance:  [x] Patient tolerated treatment well [] Patient limited by fatique  [] Patient limited by pain  [] Patient limited by other medical complications  [] Other:     Overall Progression Towards Functional goals/ Treatment Progress Update:  [x] Patient is progressing as expected towards functional goals listed. [] Progression is slowed due to complexities/Impairments listed. [] Progression has been slowed due to co-morbidities. [x] Plan just implemented, too soon to assess goals progression <30days   [] Goals require adjustment due to lack of progress  [] Patient is not progressing as expected and requires additional follow up with physician  [] Other    Prognosis for POC: [x] Good [] Fair  [] Poor    Patient requires continued skilled intervention: [] Yes  [x] No        PLAN: [x] Discharge    Electronically signed by: Jens Schmidt, PT DPT    Note: If patient does not return for scheduled/recommended follow up visits, this note will serve as a discharge from care along with the most recent update on progress.

## 2021-11-29 ENCOUNTER — APPOINTMENT (OUTPATIENT)
Dept: PHYSICAL THERAPY | Age: 61
End: 2021-11-29
Payer: COMMERCIAL

## 2021-12-14 RX ORDER — OMEPRAZOLE 40 MG/1
CAPSULE, DELAYED RELEASE ORAL
Qty: 30 CAPSULE | Refills: 0 | Status: SHIPPED | OUTPATIENT
Start: 2021-12-14 | End: 2022-01-21

## 2022-03-29 ENCOUNTER — OFFICE VISIT (OUTPATIENT)
Dept: FAMILY MEDICINE CLINIC | Age: 62
End: 2022-03-29
Payer: COMMERCIAL

## 2022-03-29 VITALS
DIASTOLIC BLOOD PRESSURE: 82 MMHG | SYSTOLIC BLOOD PRESSURE: 110 MMHG | HEIGHT: 74 IN | WEIGHT: 260 LBS | OXYGEN SATURATION: 96 % | HEART RATE: 64 BPM | TEMPERATURE: 98.2 F | BODY MASS INDEX: 33.37 KG/M2

## 2022-03-29 DIAGNOSIS — R73.01 IFG (IMPAIRED FASTING GLUCOSE): ICD-10-CM

## 2022-03-29 DIAGNOSIS — M54.50 CHRONIC BILATERAL LOW BACK PAIN WITHOUT SCIATICA: Primary | ICD-10-CM

## 2022-03-29 DIAGNOSIS — Z85.46 PERSONAL HISTORY OF PROSTATE CANCER: ICD-10-CM

## 2022-03-29 DIAGNOSIS — M48.062 SPINAL STENOSIS OF LUMBAR REGION WITH NEUROGENIC CLAUDICATION: ICD-10-CM

## 2022-03-29 DIAGNOSIS — M67.88 ACHILLES TENDINOSIS OF LEFT LOWER EXTREMITY: ICD-10-CM

## 2022-03-29 DIAGNOSIS — Z00.00 ROUTINE GENERAL MEDICAL EXAMINATION AT A HEALTH CARE FACILITY: ICD-10-CM

## 2022-03-29 DIAGNOSIS — E66.9 OBESITY, CLASS I, BMI 30-34.9: ICD-10-CM

## 2022-03-29 DIAGNOSIS — N52.34 ERECTILE DYSFUNCTION FOLLOWING SIMPLE PROSTATECTOMY: ICD-10-CM

## 2022-03-29 DIAGNOSIS — E78.2 MIXED HYPERLIPIDEMIA: ICD-10-CM

## 2022-03-29 DIAGNOSIS — G89.29 CHRONIC BILATERAL LOW BACK PAIN WITHOUT SCIATICA: Primary | ICD-10-CM

## 2022-03-29 LAB
A/G RATIO: 1.8 (ref 1.1–2.2)
ALBUMIN SERPL-MCNC: 4.4 G/DL (ref 3.4–5)
ALP BLD-CCNC: 59 U/L (ref 40–129)
ALT SERPL-CCNC: 28 U/L (ref 10–40)
ANION GAP SERPL CALCULATED.3IONS-SCNC: 12 MMOL/L (ref 3–16)
AST SERPL-CCNC: 19 U/L (ref 15–37)
BILIRUB SERPL-MCNC: 0.4 MG/DL (ref 0–1)
BUN BLDV-MCNC: 10 MG/DL (ref 7–20)
CALCIUM SERPL-MCNC: 9.2 MG/DL (ref 8.3–10.6)
CHLORIDE BLD-SCNC: 106 MMOL/L (ref 99–110)
CHOLESTEROL, FASTING: 164 MG/DL (ref 0–199)
CO2: 26 MMOL/L (ref 21–32)
CREAT SERPL-MCNC: 1.1 MG/DL (ref 0.8–1.3)
GFR AFRICAN AMERICAN: >60
GFR NON-AFRICAN AMERICAN: >60
GLUCOSE BLD-MCNC: 104 MG/DL (ref 70–99)
HCT VFR BLD CALC: 41.3 % (ref 40.5–52.5)
HDLC SERPL-MCNC: 48 MG/DL (ref 40–60)
HEMOGLOBIN: 13.6 G/DL (ref 13.5–17.5)
LDL CHOLESTEROL CALCULATED: 105 MG/DL
MCH RBC QN AUTO: 26.1 PG (ref 26–34)
MCHC RBC AUTO-ENTMCNC: 33 G/DL (ref 31–36)
MCV RBC AUTO: 79.2 FL (ref 80–100)
PDW BLD-RTO: 14.9 % (ref 12.4–15.4)
PLATELET # BLD: 262 K/UL (ref 135–450)
PMV BLD AUTO: 7.2 FL (ref 5–10.5)
POTASSIUM SERPL-SCNC: 5.1 MMOL/L (ref 3.5–5.1)
RBC # BLD: 5.21 M/UL (ref 4.2–5.9)
SODIUM BLD-SCNC: 144 MMOL/L (ref 136–145)
TOTAL PROTEIN: 6.8 G/DL (ref 6.4–8.2)
TRIGLYCERIDE, FASTING: 57 MG/DL (ref 0–150)
VLDLC SERPL CALC-MCNC: 11 MG/DL
WBC # BLD: 3.3 K/UL (ref 4–11)

## 2022-03-29 PROCEDURE — 99396 PREV VISIT EST AGE 40-64: CPT | Performed by: NURSE PRACTITIONER

## 2022-03-29 RX ORDER — OMEPRAZOLE 40 MG/1
CAPSULE, DELAYED RELEASE ORAL
Qty: 90 CAPSULE | Refills: 1 | Status: SHIPPED | OUTPATIENT
Start: 2022-03-29

## 2022-03-29 ASSESSMENT — ENCOUNTER SYMPTOMS
ABDOMINAL DISTENTION: 0
SINUS PRESSURE: 0
WHEEZING: 0
DIARRHEA: 0
SHORTNESS OF BREATH: 0
SINUS PAIN: 0
COUGH: 0
CONSTIPATION: 0
ABDOMINAL PAIN: 0
COLOR CHANGE: 0
SORE THROAT: 0
RHINORRHEA: 0
NAUSEA: 0
BACK PAIN: 1
CHOKING: 0
VOMITING: 0
BLOOD IN STOOL: 0

## 2022-03-29 NOTE — PROGRESS NOTES
Subjective:      Patient ID: María Elena Shah is a 59 y.o. y.o. male. HPI    Chief Complaint   Patient presents with    Annual Exam    Lower Back Pain     sciatic nerve left side        Jeane Cool is here for a physical.    Feeling well overall. Doing well with medications. Reprots compliance. Struggling with weight loss. Not able to exercise much due to achilles tendinosis and chronic back pain. He did notice mild relief with PT but continues to have low back pain with left side sciatica almost daily. Worse with inactivity. He has not noticed much improvement with Diclofenac. He has been eating less throughout the day. Could improve on the foods he is eating. Preventative  Diet: Doing smaller portions. Eats one meal per day. Exercise: Limited by exercise. Colon Cancer Screening: Done in 2017. Not due till 2027. Prostate Cancer Screening (Age 53-78): Following with Urology. Vitals:    03/29/22 0835   BP: 110/82   Pulse: 64   Temp: 98.2 °F (36.8 °C)   SpO2: 96%       Wt Readings from Last 3 Encounters:   03/29/22 260 lb (117.9 kg)   10/27/21 258 lb (117 kg)   06/23/21 253 lb (114.8 kg)       Past Medical History:   Diagnosis Date    Arthritis     Ganglion cyst     GERD (gastroesophageal reflux disease)     History of prostate cancer 2018    s/p tx: Under care of urology.     Hyperlipidemia     S/P colonoscopy 01/2016    per pt' next in 2fsn=2041:see scanned report    Vitamin D deficiency        Past Surgical History:   Procedure Laterality Date    ACHILLES TENDON SURGERY Left 6/23/2021    SECONDARY REPAIR OF ACHILLES, LEFT - POPLITEAL/SAPHENOUS BLOCK, LEFT performed by Malika Manley DPM at [facility] Left 6/23/2021    EXOSTECTOMY OF CALCANEAL SPUR, LEFT; APPLICATION OF BELOW THE KNEE SPLINT, LEFT -POPLITEAL/SAPHENOUS BLOCK, LEFT ([id number]) performed by Malika Manley DPM at [facility] Left 6/23/2021    ENDOSCOPIC GASTROCNEMIUS RECESSION, LEFT - POPLITEAL/SAPHENOUS BLOCK, LEFT performed by Margarita Anaya DPM at [facility] PAIN MANAGEMENT PROCEDURE Right 6/24/2020    LEFT L4 AND L5 TRANSFORAMINAL EPIDURAL STEROID INJECTION WITH FLUOROSCOPY ([id number], [id number]) performed by Faraz Blood MD at [facility] Bilateral 7/8/2020    BILATERAL L5 TRANSFORAMINAL EPIDURAL STEROID INJECTION WITH FLUOROSCOPY performed by Faraz Blood MD at [place] [address]    prostate cancer surgery    SHOULDER SURGERY Left 2017       Family History   Problem Relation Age of Onset    Sickle Cell Trait Mother     No Known Problems Father        Social History     Tobacco Use    Smoking status: Never Smoker    Smokeless tobacco: Never Used   Vaping Use    Vaping Use: Never used   Substance Use Topics    Alcohol use: Yes     Comment: rarely    Drug use: Never       Immunization History   Administered Date(s) Administered    COVID-19, Pfizer Purple top, DILUTE for use, 12+ yrs, 30mcg/0.3mL dose 03/03/2021, 03/31/2021, 10/19/2021    Influenza Virus Vaccine 10/15/2016, 10/15/2019    Influenza, MDCK Quadv, IM, PF (Flucelvax 2 yrs and older) 10/27/2021    Influenza, Quadv, IM, PF (6 mo and older Fluzone, Flulaval, Fluarix, and 3 yrs and older Afluria) 09/23/2020    Tdap (Boostrix, Adacel) 06/03/2021    Zoster Recombinant (Shingrix) 06/03/2021, 10/27/2021       Health Maintenance   Topic Date Due    Hepatitis C screen  Never done    HIV screen  Never done    Lipid screen  06/03/2022    Depression Screen  10/27/2022    Colorectal Cancer Screen  01/13/2027    DTaP/Tdap/Td vaccine (2 - Td or Tdap) 06/03/2031    Flu vaccine  Completed    Shingles Vaccine  Completed    COVID-19 Vaccine  Completed    Hepatitis A vaccine  Aged Out    Hepatitis B vaccine  Aged Out    Hib vaccine  Aged Out    Meningococcal (ACWY) vaccine  Aged Out    Pneumococcal 0-64 years Vaccine  Aged Out       Review of Systems   Constitutional: Negative for activity change, appetite change, chills, fatigue, fever and unexpected weight change. HENT: Negative for congestion, ear pain, rhinorrhea, sinus pressure, sinus pain, sneezing, sore throat and tinnitus. Respiratory: Negative for cough, choking, shortness of breath and wheezing. Cardiovascular: Negative for chest pain, palpitations and leg swelling. Gastrointestinal: Negative for abdominal distention, abdominal pain, blood in stool, constipation, diarrhea, nausea and vomiting. Endocrine: Negative for cold intolerance, heat intolerance, polydipsia, polyphagia and polyuria. Genitourinary: Negative for decreased urine volume, difficulty urinating, dysuria, hematuria, penile discharge and urgency. Musculoskeletal: Positive for arthralgias (ankle) and back pain. Negative for gait problem and myalgias. Skin: Negative for color change, pallor, rash and wound. Neurological: Negative for dizziness, tremors, syncope, weakness, light-headedness and headaches. Hematological: Negative for adenopathy. Does not bruise/bleed easily. Psychiatric/Behavioral: Negative for agitation, behavioral problems, confusion, dysphoric mood and sleep disturbance. The patient is not nervous/anxious. Objective:   Physical Exam  Constitutional:       Appearance: He is well-developed. HENT:      Head: Normocephalic and atraumatic. Right Ear: Hearing, tympanic membrane and external ear normal.      Left Ear: Hearing, tympanic membrane and external ear normal.      Nose: Nose normal.      Mouth/Throat:      Pharynx: No oropharyngeal exudate. Eyes:      General: Lids are normal.      Conjunctiva/sclera: Conjunctivae normal.      Pupils: Pupils are equal, round, and reactive to light. Neck:      Thyroid: No thyromegaly. Vascular: No JVD. Cardiovascular:      Rate and Rhythm: Normal rate and regular rhythm. Heart sounds: Normal heart sounds. No murmur heard.   No friction rub. No gallop. No S3 or S4 sounds. Pulmonary:      Effort: Pulmonary effort is normal. No respiratory distress. Breath sounds: Normal breath sounds. No wheezing. Abdominal:      General: Bowel sounds are normal. There is no distension. Palpations: Abdomen is soft. There is no mass. Tenderness: There is no abdominal tenderness. There is no guarding. Hernia: No hernia is present. Musculoskeletal:         General: No tenderness or deformity. Normal range of motion. Cervical back: Normal range of motion and neck supple. Skin:     General: Skin is warm and dry. Capillary Refill: Capillary refill takes less than 2 seconds. Findings: No erythema or rash. Neurological:      Mental Status: He is alert and oriented to person, place, and time. Cranial Nerves: No cranial nerve deficit. Deep Tendon Reflexes: Reflexes normal.   Psychiatric:         Speech: Speech normal.         Behavior: Behavior normal.         Thought Content: Thought content normal.         Assessment:      See ProblemList assessment and plan       Plan:      Chronic bilateral low back pain without sciatica  Persistent despite physical therapy  No relief with diclofenac  Refer to Ortho, Dr. Kristal Mckeon    Obesity, Class I, BMI 30-34.9  Discussed diet  Suggested my fitness pal  Discussed \"my plate\"    Erectile dysfunction following simple prostatectomy  Stable, controlled  Doing well on Cialis    Mixed hyperlipidemia  Stable, controlled  Tolerating statin  Recheck lipids    Personal history of prostate cancer  Following with urology  Prostatectomy in 2018    Achilles tendinosis of left lower extremity  Following with Ortho  Gradual improvement    Routine general medical examination at a health care facility  Discussed vaccines   Encouraged healthy diet and exercise   Check labs  HM up-to-date  Well exam in office         Patient engaged in shared decision making.  Information given to evaluate options of treatment, understand what is needed and discuss importance of following plan.

## 2022-03-29 NOTE — ASSESSMENT & PLAN NOTE
Discussed vaccines   Encouraged healthy diet and exercise   Check labs  HM up-to-date  Well exam in office

## 2022-03-29 NOTE — PATIENT INSTRUCTIONS
Patient Education        Learning About Dietary Guidelines  What are the Dietary Guidelines for Americans? Dietary Guidelines for Americans provide tips for eating well and stayinghealthy. This helps reduce the risk for long-term (chronic) diseases. These guidelines recommend that you:   Eat and drink the right amount for you. The U.S. government's food guide is called MyPlate. It can help you make your own well-balanced eating plan.  Try to balance your eating with your activity. This helps you stay at a healthy weight.  Drink alcohol in moderation, if at all.  Limit foods high in salt, saturated fat, trans fat, and added sugar. These guidelines are from the U.S. Department of Agriculture and the U. S. Department of Health and [employer]. They are updated every 5 years. What is MyPlate? MyPlate is the U.S. government's food guide. It can help you make your own well-balanced eating plan. A balanced eating plan means that you eat enough, but not too much, and that your food gives you the nutrients you need to stayhealthy. MyPlate focuses on eating plenty of whole grains, fruits, and vegetables, andon limiting fat and sugar. It is available online at www. ChooseMyPlate.gov. How can you get started? If you're trying to eat healthier, you can slowly change your eating habits over time. You don't have to make big changes all at once. Start by adding oneor two healthy foods to your meals each day. Grains  Choose whole-grain breads and cereals and whole-wheat pasta and whole-graincrackers. Vegetables  Eat a variety of vegetables every day. They have lots of nutrients and are partof a heart-healthy diet. Fruits  Eat a variety of fruits every day. Fruits contain lots of nutrients. Choosefresh fruit instead of fruit juice. Protein foods  Choose fish and lean poultry more often. Eat red meat and fried meats lessoften. Dried beans, tofu, and nuts are also good sources of protein.   Dairy  Choose low-fat or fat-free products from this food group. If you have problemsdigesting milk, try eating cheese or yogurt instead. Fats and oils  Limit fats and oils if you're trying to cut calories. Choose healthy fats whenyou cook. These include canola oil and olive oil. Where can you learn more? Go to https://chpezack.healthEverloop. org and sign in to your [employer] account. Enter A241 in the [place] box to learn more about \"Learning About Dietary Guidelines. \"     If you do not have an account, please click on the \"Sign Up Now\" link. Current as of: September 8, 2021               Content Version: 13.2  © 2006-2022 Healthwise, Incorporated. Care instructions adapted under license by [facility] ([facility]). If you have questions about a medical condition or this instruction, always ask your healthcare professional. [address] any warranty or liability for your use of this information.

## 2022-03-30 ENCOUNTER — TELEPHONE (OUTPATIENT)
Dept: FAMILY MEDICINE CLINIC | Age: 62
End: 2022-03-30

## 2022-03-30 LAB
ESTIMATED AVERAGE GLUCOSE: 111.2 MG/DL
HBA1C MFR BLD: 5.5 %

## 2022-04-28 PROBLEM — Z00.00 ROUTINE GENERAL MEDICAL EXAMINATION AT A HEALTH CARE FACILITY: Status: RESOLVED | Noted: 2021-06-03 | Resolved: 2022-04-28

## 2022-07-06 ENCOUNTER — OFFICE VISIT (OUTPATIENT)
Dept: FAMILY MEDICINE CLINIC | Age: 62
End: 2022-07-06
Payer: COMMERCIAL

## 2022-07-06 VITALS
HEIGHT: 74 IN | WEIGHT: 257 LBS | BODY MASS INDEX: 32.98 KG/M2 | HEART RATE: 78 BPM | TEMPERATURE: 96.8 F | SYSTOLIC BLOOD PRESSURE: 122 MMHG | DIASTOLIC BLOOD PRESSURE: 82 MMHG | OXYGEN SATURATION: 98 %

## 2022-07-06 DIAGNOSIS — M54.42 CHRONIC BILATERAL LOW BACK PAIN WITH LEFT-SIDED SCIATICA: Primary | ICD-10-CM

## 2022-07-06 DIAGNOSIS — G89.29 CHRONIC BILATERAL LOW BACK PAIN WITH LEFT-SIDED SCIATICA: Primary | ICD-10-CM

## 2022-07-06 PROCEDURE — 99214 OFFICE O/P EST MOD 30 MIN: CPT | Performed by: NURSE PRACTITIONER

## 2022-07-06 RX ORDER — PREDNISONE 20 MG/1
TABLET ORAL
Qty: 15 TABLET | Refills: 0 | Status: SHIPPED | OUTPATIENT
Start: 2022-07-06

## 2022-07-06 SDOH — ECONOMIC STABILITY: FOOD INSECURITY: WITHIN THE PAST 12 MONTHS, THE FOOD YOU BOUGHT JUST DIDN'T LAST AND YOU DIDN'T HAVE MONEY TO GET MORE.: NEVER TRUE

## 2022-07-06 SDOH — ECONOMIC STABILITY: FOOD INSECURITY: WITHIN THE PAST 12 MONTHS, YOU WORRIED THAT YOUR FOOD WOULD RUN OUT BEFORE YOU GOT MONEY TO BUY MORE.: NEVER TRUE

## 2022-07-06 ASSESSMENT — ENCOUNTER SYMPTOMS
WHEEZING: 0
COLOR CHANGE: 0
SHORTNESS OF BREATH: 0
BACK PAIN: 1
SINUS PAIN: 0
ABDOMINAL PAIN: 0
SINUS PRESSURE: 0
COUGH: 0
DIARRHEA: 0
CONSTIPATION: 0

## 2022-07-06 ASSESSMENT — PATIENT HEALTH QUESTIONNAIRE - PHQ9
SUM OF ALL RESPONSES TO PHQ QUESTIONS 1-9: 0
SUM OF ALL RESPONSES TO PHQ9 QUESTIONS 1 & 2: 0
1. LITTLE INTEREST OR PLEASURE IN DOING THINGS: 0
SUM OF ALL RESPONSES TO PHQ QUESTIONS 1-9: 0
2. FEELING DOWN, DEPRESSED OR HOPELESS: 0
SUM OF ALL RESPONSES TO PHQ QUESTIONS 1-9: 0
DEPRESSION UNABLE TO ASSESS: FUNCTIONAL CAPACITY MOTIVATION LIMITS ACCURACY
SUM OF ALL RESPONSES TO PHQ QUESTIONS 1-9: 0

## 2022-07-06 ASSESSMENT — SOCIAL DETERMINANTS OF HEALTH (SDOH): HOW HARD IS IT FOR YOU TO PAY FOR THE VERY BASICS LIKE FOOD, HOUSING, MEDICAL CARE, AND HEATING?: NOT HARD AT ALL

## 2022-07-06 NOTE — PROGRESS NOTES
Ros Cool (:  1960) is a 59 y.o. male,Established patient, here for evaluation of the following chief complaint(s):  Back Pain (pain, stiffness, sciatic pain, pain is more regular, has been going on for over 30 years, has history of sports, protruding L4, has had injections in 2018 which helped some)      ASSESSMENT/PLAN:  1. Chronic bilateral low back pain with left-sided sciatica  Assessment & Plan:  Prednisone taper  No diclofenac while he is taking prednisone  Tylenol as needed  Refer to Li      No follow-ups on file. SUBJECTIVE/OBJECTIVE:  HPI   Patient is here for continued low back pain. This is been a chronic issue. Has been worsening over the past few months. Had MRI in  which showed spinal stenosis with disc bulging and nerve involvement. .  He has undergone physical therapy which was not helpful. He did have a steroid injection in 2018 which helped for short time. He has been taking diclofenac without relief. Pain radiates down the left leg. He does note numbness and tingling in his foot. No weakness or bowel or bladder incontinence. Feels he is ready to see a specialist for further treatment. Current Outpatient Medications   Medication Sig Dispense Refill    predniSONE (DELTASONE) 20 MG tablet 2 tabs x 5 days, 1 tab for 5 days 15 tablet 0    omeprazole (PRILOSEC) 40 MG delayed release capsule Take 1 capsule by mouth once daily 90 capsule 1    diclofenac (VOLTAREN) 50 MG EC tablet Take 1 tablet by mouth 2 times daily 60 tablet 3    atorvastatin (LIPITOR) 20 MG tablet Take 1 tablet by mouth daily 30 tablet 2    tadalafil (CIALIS) 5 MG tablet TAKE 1 TABLET BY MOUTH ONCE DAILY       No current facility-administered medications for this visit. Review of Systems   Constitutional: Negative for chills, fatigue and fever. HENT: Negative for congestion, sinus pressure and sinus pain. Respiratory: Negative for cough, shortness of breath and wheezing.

## 2022-07-20 ENCOUNTER — HOSPITAL ENCOUNTER (OUTPATIENT)
Age: 62
Discharge: HOME | End: 2022-07-20
Payer: COMMERCIAL

## 2022-07-20 ENCOUNTER — HOSPITAL ENCOUNTER (OUTPATIENT)
Dept: GENERAL RADIOLOGY | Age: 62
Discharge: HOME | End: 2022-07-20
Payer: COMMERCIAL

## 2022-07-20 DIAGNOSIS — R52 PAIN: ICD-10-CM

## 2022-07-20 PROCEDURE — 72110 X-RAY EXAM L-2 SPINE 4/>VWS: CPT

## 2022-07-31 ENCOUNTER — HOSPITAL ENCOUNTER (EMERGENCY)
Age: 62
Discharge: HOME | End: 2022-07-31
Payer: COMMERCIAL

## 2022-07-31 ENCOUNTER — APPOINTMENT (OUTPATIENT)
Dept: GENERAL RADIOLOGY | Age: 62
End: 2022-07-31
Payer: COMMERCIAL

## 2022-07-31 VITALS
DIASTOLIC BLOOD PRESSURE: 90 MMHG | SYSTOLIC BLOOD PRESSURE: 132 MMHG | TEMPERATURE: 98 F | BODY MASS INDEX: 32.77 KG/M2 | WEIGHT: 255.25 LBS | OXYGEN SATURATION: 97 % | RESPIRATION RATE: 16 BRPM | HEART RATE: 82 BPM

## 2022-07-31 DIAGNOSIS — M76.62 LEFT ACHILLES TENDINITIS: Primary | ICD-10-CM

## 2022-07-31 PROCEDURE — 99284 EMERGENCY DEPT VISIT MOD MDM: CPT

## 2022-07-31 PROCEDURE — 6360000002 HC RX W HCPCS: Performed by: PHYSICIAN ASSISTANT

## 2022-07-31 PROCEDURE — 96372 THER/PROPH/DIAG INJ SC/IM: CPT

## 2022-07-31 PROCEDURE — 73610 X-RAY EXAM OF ANKLE: CPT

## 2022-07-31 RX ORDER — KETOROLAC TROMETHAMINE 30 MG/ML
30 INJECTION, SOLUTION INTRAMUSCULAR; INTRAVENOUS ONCE
Status: COMPLETED | OUTPATIENT
Start: 2022-07-31 | End: 2022-07-31

## 2022-07-31 RX ADMIN — KETOROLAC TROMETHAMINE 30 MG: 30 INJECTION, SOLUTION INTRAMUSCULAR at 11:09

## 2022-07-31 ASSESSMENT — PAIN - FUNCTIONAL ASSESSMENT: PAIN_FUNCTIONAL_ASSESSMENT: 0-10

## 2022-07-31 ASSESSMENT — ENCOUNTER SYMPTOMS
BACK PAIN: 0
RESPIRATORY NEGATIVE: 1

## 2022-07-31 ASSESSMENT — PAIN SCALES - GENERAL
PAINLEVEL_OUTOF10: 1
PAINLEVEL_OUTOF10: 1

## 2022-07-31 NOTE — ED PROVIDER NOTES
[facility]        Pt Name: Melly Nicole  MRN: [id number]  [place] 1960  Date of evaluation: 7/31/2022  Provider: Tram Webb PA-C  PCP: PRIYA Casarez CNP  Note Started: 11:16 AM EDT       VENUS. I have evaluated this patient. My supervising physician was available for consultation. CHIEF COMPLAINT       Chief Complaint   Patient presents with    Other     Pain to L achilles x2 days       HISTORY OF PRESENT ILLNESS   (Location, Timing/Onset, Context/Setting, Quality, Duration, Modifying Factors, Severity, Associated Signs and Symptoms)  Note limiting factors. Chief Complaint: Left heel pain    Dee Cool is a 59 y.o. male with past medical history of hyperlipidemia, heel spur surgery on the left x1 year ago who presents     Nursing Notes were all reviewed and agreed with or any disagreements were addressed in the HPI. REVIEW OF SYSTEMS    (2-9 systems for level 4, 10 or more for level 5)     Review of Systems   Constitutional: Negative. Respiratory: Negative. Cardiovascular: Negative. Musculoskeletal:  Negative for back pain and neck pain. Left heel pain   Skin: Negative. Neurological: Negative. Positives and Pertinent negatives as per HPI. Except as noted above in the ROS, all other systems were reviewed and negative. PAST MEDICAL HISTORY     Past Medical History:   Diagnosis Date    Arthritis     Ganglion cyst     GERD (gastroesophageal reflux disease)     History of prostate cancer 2018    s/p tx: Under care of urology.     Hyperlipidemia     S/P colonoscopy 01/2016    per pt' next in [id number]=[id number]:see scanned report    Vitamin D deficiency          SURGICAL HISTORY     Past Surgical History:   Procedure Laterality Date    ACHILLES TENDON SURGERY Left 6/23/2021    SECONDARY REPAIR OF ACHILLES, LEFT - POPLITEAL/SAPHENOUS BLOCK, LEFT performed by Vikas Valdez DPM at [address] [facility]    FOOT SURGERY Left 6/23/2021    EXOSTECTOMY OF CALCANEAL SPUR, LEFT; APPLICATION OF BELOW THE KNEE SPLINT, LEFT -POPLITEAL/SAPHENOUS BLOCK, LEFT ([id number]) performed by Lencho Pozo DPM at [place] Left 6/23/2021    ENDOSCOPIC GASTROCNEMIUS RECESSION, LEFT - POPLITEAL/SAPHENOUS BLOCK, LEFT performed by Lencho Pozo DPM at [address] [place] [address] Right 6/24/2020    LEFT L4 AND L5 TRANSFORAMINAL EPIDURAL STEROID INJECTION WITH FLUOROSCOPY ([id number], [id number]) performed by Mike Houston MD at [address] [place] [address] Bilateral 7/8/2020    BILATERAL L5 TRANSFORAMINAL EPIDURAL STEROID INJECTION WITH FLUOROSCOPY performed by Mike Houston MD at [place] [address]  2018    prostate cancer surgery    SHOULDER SURGERY Left 2017         [address]       Current Discharge Medication List        CONTINUE these medications which have NOT CHANGED    Details   predniSONE (DELTASONE) 20 MG tablet 2 tabs x 5 days, 1 tab for 5 days  Qty: 15 tablet, Refills: 0    Associated Diagnoses: Chronic bilateral low back pain with left-sided sciatica      omeprazole (PRILOSEC) 40 MG delayed release capsule Take 1 capsule by mouth once daily  Qty: 90 capsule, Refills: 1      atorvastatin (LIPITOR) 20 MG tablet Take 1 tablet by mouth daily  Qty: 30 tablet, Refills: 2    Associated Diagnoses: Mixed hyperlipidemia      tadalafil (CIALIS) 5 MG tablet TAKE 1 TABLET BY MOUTH ONCE DAILY               ALLERGIES     Patient has no known allergies.     FAMILYHISTORY       Family History   Problem Relation Age of Onset    Sickle Cell Trait Mother     No Known Problems Father           SOCIAL HISTORY       Social History     Tobacco Use    Smoking status: Never    Smokeless tobacco: Never   Vaping Use    Vaping Use: Never used   Substance Use Topics    Alcohol use: Yes     Comment: rarely    Drug use: Never       SCREENINGS    [place] Coma Scale  Eye Opening: Spontaneous  Best Verbal Response: Oriented  Best Motor Response: Obeys commands  [place] Coma Scale Score: 15        PHYSICAL EXAM    (up to 7 for level 4, 8 or more for level 5)     ED Triage Vitals   BP Temp Temp src Heart Rate Resp SpO2 Height Weight   07/31/22 1105 07/31/22 1105 -- 07/31/22 1105 07/31/22 1105 07/31/22 1105 -- 07/31/22 1053   (!) 132/90 98 °F (36.7 °C)  82 16 97 %  255 lb 4 oz (115.8 kg)       Physical Exam  Vitals and nursing note reviewed. Constitutional:       General: He is not in acute distress. Appearance: Normal appearance. He is not ill-appearing or toxic-appearing. HENT:      Head: Normocephalic and atraumatic. Right Ear: External ear normal.      Left Ear: External ear normal.   Eyes:      Conjunctiva/sclera: Conjunctivae normal.   Cardiovascular:      Rate and Rhythm: Normal rate. Pulses: Normal pulses. Pulmonary:      Effort: Pulmonary effort is normal.   Musculoskeletal:         General: Tenderness (left distal achilles tender with mild edema. no erythema. Achilles intact/neg Hui's) present. No deformity. Cervical back: Normal range of motion. Right lower leg: No edema. Skin:     General: Skin is warm and dry. Neurological:      General: No focal deficit present. Mental Status: He is alert and oriented to person, place, and time. Psychiatric:         Mood and Affect: Mood normal.         Behavior: Behavior normal.       DIAGNOSTIC RESULTS   LABS:    Labs Reviewed - No data to display    When ordered only abnormal lab results are displayed. All other labs were within normal range or not returned as of this dictation. EKG: When ordered, EKG's are interpreted by the Emergency Department Physician in the absence of a cardiologist.  Please see their note for interpretation of EKG.     RADIOLOGY:   Non-plain film images such as CT, Ultrasound and MRI are read by the radiologist. Plain radiographic images are visualized and preliminarily interpreted by the ED Provider with the below findings:        Interpretation per the Radiologist below, if available at the time of this note:    XR ANKLE LEFT (MIN 3 VIEWS)    (Results Pending)     No results found. PROCEDURES   Unless otherwise noted below, none     Procedures    CRITICAL CARE TIME       CONSULTS:  None      EMERGENCY DEPARTMENT COURSE and DIFFERENTIAL DIAGNOSIS/MDM:   Vitals:    Vitals:    07/31/22 1053 07/31/22 1105   BP:  (!) 132/90   Pulse:  82   Resp:  16   Temp:  98 °F (36.7 °C)   SpO2:  97%   Weight: 255 lb 4 oz (115.8 kg)        Patient was given the following medications:  Medications   ketorolac (TORADOL) injection 30 mg (30 mg IntraMUSCular Given 7/31/22 1109)         Is this patient to be included in the SEP-1 Core Measure due to severe sepsis or septic shock? No   Exclusion criteria - the patient is NOT to be included for SEP-1 Core Measure due to: Infection is not suspected    Xray w/o fracture, dislocation or emergent findings. No erythema, warmth, signs of infection or fever. Pt wearing walking boot, will instruct to wear when up and moving. He request crutches, given. Pt instructed to f/u with his prior podiatrist who did his surgery last yr or also given ortho for follow up. Instructed to return for any new or worsening symptoms. FINAL IMPRESSION      1. Left Achilles tendinitis          DISPOSITION/PLAN   DISPOSITION Decision To Discharge 07/31/2022 11:25:59 AM      PATIENT REFERRED TO:  Bri Barahona, [address] [place] [address] [phone]    In 2 days  Your podiatrist. or follow up with orthopedics listed below. Renny Diaz MD  [address]  [facility] Floor [facility]  [phone]    In 3 days  Ortho follow up. return for any new or worsening symptoms.     DISCHARGE MEDICATIONS:  Current Discharge Medication List          DISCONTINUED MEDICATIONS:  Current Discharge Medication List                 (Please note that portions of this note were completed with a voice recognition program.  Efforts were made to edit the dictations but occasionally words are mis-transcribed. )    Nette Luna PA-C (electronically signed)            Nette Luna PA-C  07/31/22 2497

## 2022-07-31 NOTE — ED NOTES
Pt declined crutches. \"I Have some at home. \"  Patient discharged  to home in stable condition via private car. Discharge instructions and prescriptions reviewed with patient. Patient verbalized understanding. All belongings in tow including discharge paperwork.           Layo Quijano RN  07/31/22 8594

## 2022-11-02 ENCOUNTER — HOSPITAL ENCOUNTER (OUTPATIENT)
Dept: PHYSICAL THERAPY | Age: 62
Setting detail: THERAPIES SERIES
Discharge: HOME | End: 2022-11-02

## 2022-11-02 NOTE — FLOWSHEET NOTE
[address]     Physical Therapy  Cancellation/No-show Note  Patient Name:  Christi Cool  :  1960   Date:  2022  Cancelled visits to date: 1  No-shows to date: 0    Patient status for today's appointment patient:  [x]  Cancelled   []  Rescheduled appointment  []  No-show     Reason given by patient:  []  Patient ill  []  Conflicting appointment  []  No transportation    []  Conflict with work  []  No reason given  [x]  Other:  unable to make it, will call to R/S   Comments:      Phone call information:   []  Phone call made today to patient at _ time at number provided:      []  Patient answered, conversation as follows:    []  Patient did not answer, message left as follows:  []  Phone call not made today  [x]  Phone call not needed - pt contacted us to cancel and provided reason for cancellation.      Electronically signed by:  Percy Peterson, PT

## 2022-12-14 ENCOUNTER — OFFICE VISIT (OUTPATIENT)
Dept: FAMILY MEDICINE CLINIC | Age: 62
End: 2022-12-14
Payer: COMMERCIAL

## 2022-12-14 VITALS
TEMPERATURE: 98.3 F | HEART RATE: 71 BPM | HEIGHT: 74 IN | SYSTOLIC BLOOD PRESSURE: 110 MMHG | BODY MASS INDEX: 32.6 KG/M2 | OXYGEN SATURATION: 98 % | WEIGHT: 254 LBS | DIASTOLIC BLOOD PRESSURE: 78 MMHG

## 2022-12-14 DIAGNOSIS — M54.42 CHRONIC BILATERAL LOW BACK PAIN WITH LEFT-SIDED SCIATICA: Primary | ICD-10-CM

## 2022-12-14 DIAGNOSIS — E78.2 MIXED HYPERLIPIDEMIA: ICD-10-CM

## 2022-12-14 DIAGNOSIS — N52.34 ERECTILE DYSFUNCTION FOLLOWING SIMPLE PROSTATECTOMY: ICD-10-CM

## 2022-12-14 DIAGNOSIS — G89.29 CHRONIC BILATERAL LOW BACK PAIN WITH LEFT-SIDED SCIATICA: Primary | ICD-10-CM

## 2022-12-14 LAB
CHOLESTEROL, FASTING: 257 MG/DL (ref 0–199)
HDLC SERPL-MCNC: 47 MG/DL (ref 40–60)
LDL CHOLESTEROL CALCULATED: 195 MG/DL
TRIGLYCERIDE, FASTING: 77 MG/DL (ref 0–150)
VLDLC SERPL CALC-MCNC: 15 MG/DL

## 2022-12-14 PROCEDURE — 99214 OFFICE O/P EST MOD 30 MIN: CPT | Performed by: NURSE PRACTITIONER

## 2022-12-14 ASSESSMENT — ENCOUNTER SYMPTOMS
COUGH: 0
DIARRHEA: 0
COLOR CHANGE: 0
WHEEZING: 0
SHORTNESS OF BREATH: 0
SINUS PRESSURE: 0
SINUS PAIN: 0
ABDOMINAL PAIN: 0
CONSTIPATION: 0
BACK PAIN: 1

## 2022-12-14 NOTE — PROGRESS NOTES
Estrada Cool (:  1960) is a 57 y.o. male,Established patient, here for evaluation of the following chief complaint(s):  Follow-up      ASSESSMENT/PLAN:  1. Chronic bilateral low back pain with left-sided sciatica  Assessment & Plan:   Monitored by specialist- no acute findings meriting change in the plan  2. Mixed hyperlipidemia  Assessment & Plan:  Not taking statin   Check lipids   Orders:  -     Lipid, Fasting; Future  3. Erectile dysfunction following simple prostatectomy  Assessment & Plan:  Stable, controlled   Continue cialis       No follow-ups on file. SUBJECTIVE/OBJECTIVE:  HPI  Here for follow up. Not taking atorvastatin currently. He has been off of it for some time. He wishes to stop it all together if possible. He did see [facility] for his back. Received to epidural injection which have worked well so far. Will likely need surgery in the future. Not taking diclofenac currently. Current Outpatient Medications   Medication Sig Dispense Refill    omeprazole (PRILOSEC) 40 MG delayed release capsule Take 1 capsule by mouth once daily 90 capsule 1    tadalafil (CIALIS) 5 MG tablet TAKE 1 TABLET BY MOUTH ONCE DAILY      atorvastatin (LIPITOR) 20 MG tablet Take 1 tablet by mouth daily (Patient not taking: Reported on 2022) 30 tablet 2     No current facility-administered medications for this visit. Review of Systems   Constitutional:  Negative for chills, fatigue and fever. HENT:  Negative for congestion, sinus pressure and sinus pain. Respiratory:  Negative for cough, shortness of breath and wheezing. Cardiovascular:  Negative for chest pain and palpitations. Gastrointestinal:  Negative for abdominal pain, constipation and diarrhea. Musculoskeletal:  Positive for back pain. Negative for arthralgias and myalgias. Skin:  Negative for color change, pallor and rash. Neurological:  Negative for dizziness, syncope, weakness, light-headedness and headaches. Psychiatric/Behavioral:  Negative for behavioral problems, confusion and sleep disturbance. The patient is not nervous/anxious. Vitals:    12/14/22 0852   BP: 110/78   Site: Left Upper Arm   Position: Sitting   Cuff Size: Large Adult   Pulse: 71   Temp: 98.3 °F (36.8 °C)   SpO2: 98%   Weight: 254 lb (115.2 kg)   Height: 6' 2\" (1.88 m)       Physical Exam  Constitutional:       Appearance: He is well-developed. HENT:      Head: Normocephalic and atraumatic. Eyes:      Conjunctiva/sclera: Conjunctivae normal.      Pupils: Pupils are equal, round, and reactive to light. Neck:      Thyroid: No thyromegaly. Vascular: No JVD. Cardiovascular:      Rate and Rhythm: Normal rate and regular rhythm. Heart sounds: Normal heart sounds. Pulmonary:      Effort: Pulmonary effort is normal. No respiratory distress. Breath sounds: Normal breath sounds. No wheezing. Musculoskeletal:         General: No deformity. Normal range of motion. Cervical back: Normal range of motion and neck supple. Skin:     General: Skin is warm and dry. Capillary Refill: Capillary refill takes less than 2 seconds. Neurological:      Mental Status: He is alert and oriented to person, place, and time. Psychiatric:         Behavior: Behavior normal.               An electronic signature was used to authenticate this note.     --PRIYA Odom - CNP

## 2022-12-15 RX ORDER — ATORVASTATIN CALCIUM 20 MG/1
20 TABLET, FILM COATED ORAL DAILY
Qty: 90 TABLET | Refills: 3 | Status: SHIPPED | OUTPATIENT
Start: 2022-12-15

## 2023-03-29 RX ORDER — OMEPRAZOLE 40 MG/1
CAPSULE, DELAYED RELEASE ORAL
Qty: 90 CAPSULE | Refills: 0 | Status: SHIPPED | OUTPATIENT
Start: 2023-03-29

## 2023-04-19 ENCOUNTER — OFFICE VISIT (OUTPATIENT)
Dept: FAMILY MEDICINE CLINIC | Age: 63
End: 2023-04-19
Payer: COMMERCIAL

## 2023-04-19 VITALS
OXYGEN SATURATION: 97 % | HEART RATE: 72 BPM | SYSTOLIC BLOOD PRESSURE: 110 MMHG | DIASTOLIC BLOOD PRESSURE: 78 MMHG | WEIGHT: 250 LBS | BODY MASS INDEX: 32.08 KG/M2 | HEIGHT: 74 IN | TEMPERATURE: 98.3 F

## 2023-04-19 DIAGNOSIS — M62.830 BACK MUSCLE SPASM: ICD-10-CM

## 2023-04-19 DIAGNOSIS — M54.32 SCIATICA, LEFT SIDE: Primary | ICD-10-CM

## 2023-04-19 PROCEDURE — 99214 OFFICE O/P EST MOD 30 MIN: CPT | Performed by: NURSE PRACTITIONER

## 2023-04-19 RX ORDER — CYCLOBENZAPRINE HCL 5 MG
5 TABLET ORAL 3 TIMES DAILY PRN
Qty: 30 TABLET | Refills: 0 | Status: SHIPPED | OUTPATIENT
Start: 2023-04-19 | End: 2023-04-29

## 2023-04-19 RX ORDER — PREDNISONE 20 MG/1
TABLET ORAL
Qty: 15 TABLET | Refills: 0 | Status: SHIPPED | OUTPATIENT
Start: 2023-04-19

## 2023-04-19 SDOH — ECONOMIC STABILITY: FOOD INSECURITY: WITHIN THE PAST 12 MONTHS, THE FOOD YOU BOUGHT JUST DIDN'T LAST AND YOU DIDN'T HAVE MONEY TO GET MORE.: NEVER TRUE

## 2023-04-19 SDOH — ECONOMIC STABILITY: INCOME INSECURITY: HOW HARD IS IT FOR YOU TO PAY FOR THE VERY BASICS LIKE FOOD, HOUSING, MEDICAL CARE, AND HEATING?: NOT HARD AT ALL

## 2023-04-19 SDOH — ECONOMIC STABILITY: HOUSING INSECURITY
IN THE LAST 12 MONTHS, WAS THERE A TIME WHEN YOU DID NOT HAVE A STEADY PLACE TO SLEEP OR SLEPT IN A SHELTER (INCLUDING NOW)?: NO

## 2023-04-19 SDOH — ECONOMIC STABILITY: FOOD INSECURITY: WITHIN THE PAST 12 MONTHS, YOU WORRIED THAT YOUR FOOD WOULD RUN OUT BEFORE YOU GOT MONEY TO BUY MORE.: NEVER TRUE

## 2023-04-19 ASSESSMENT — PATIENT HEALTH QUESTIONNAIRE - PHQ9
SUM OF ALL RESPONSES TO PHQ QUESTIONS 1-9: 0
SUM OF ALL RESPONSES TO PHQ9 QUESTIONS 1 & 2: 0
DEPRESSION UNABLE TO ASSESS: FUNCTIONAL CAPACITY MOTIVATION LIMITS ACCURACY
1. LITTLE INTEREST OR PLEASURE IN DOING THINGS: 0
SUM OF ALL RESPONSES TO PHQ QUESTIONS 1-9: 0
SUM OF ALL RESPONSES TO PHQ QUESTIONS 1-9: 0
2. FEELING DOWN, DEPRESSED OR HOPELESS: 0
SUM OF ALL RESPONSES TO PHQ QUESTIONS 1-9: 0

## 2023-04-19 ASSESSMENT — ENCOUNTER SYMPTOMS: BACK PAIN: 1

## 2023-04-19 NOTE — PROGRESS NOTES
Pricila Cool (:  1960) is a 57 y.o. male,Established patient, here for evaluation of the following chief complaint(s):  Hip Pain (Left hip, intermittent, has been present for months)    ASSESSMENT/PLAN:  1. Sciatica, left side  Assessment & Plan:  Begin prednisone as prescribed     Orders:  -     predniSONE (DELTASONE) 20 MG tablet; Take 2 tablets for 5 days, then 1 tablet for 5 days, Disp-15 tablet, R-0Normal  2. Back muscle spasm  Assessment & Plan:  Take muscle relaxer as prescribed  Education provided  Orders:  -     cyclobenzaprine (FLEXERIL) 5 MG tablet; Take 1 tablet by mouth 3 times daily as needed for Muscle spasms, Disp-30 tablet, R-0Normal      Return if symptoms worsen or fail to improve. SUBJECTIVE/OBJECTIVE:  HPI  Patient presents to the office with complaints of pain and spasms in his left buttocks/hip that radiates down is left thigh. Patient reports this has been going on for a couple weeks but has gotten worse. Patient states it gets worse when he walks and feels better when he is resting or sitting. Patient has tried taking ibuprofen with no relief. Patient reports history of chronic low back pain but it has not radiated down his legs in the past. Patient has seen PT in the past for his back pain. Received injection to his back few months ago. Following with Dr. William Baeza.        Current Outpatient Medications   Medication Sig Dispense Refill    predniSONE (DELTASONE) 20 MG tablet Take 2 tablets for 5 days, then 1 tablet for 5 days 15 tablet 0    cyclobenzaprine (FLEXERIL) 5 MG tablet Take 1 tablet by mouth 3 times daily as needed for Muscle spasms 30 tablet 0    diclofenac (VOLTAREN) 50 MG EC tablet Take 1 tablet by mouth twice daily 60 tablet 0    omeprazole (PRILOSEC) 40 MG delayed release capsule Take 1 capsule by mouth once daily 90 capsule 0    atorvastatin (LIPITOR) 20 MG tablet Take 1 tablet by mouth daily 90 tablet 3    tadalafil (CIALIS) 5 MG tablet TAKE 1 TABLET BY MOUTH ONCE

## 2023-05-31 ENCOUNTER — OFFICE VISIT (OUTPATIENT)
Dept: FAMILY MEDICINE CLINIC | Age: 63
End: 2023-05-31
Payer: COMMERCIAL

## 2023-05-31 VITALS
BODY MASS INDEX: 31.7 KG/M2 | HEART RATE: 71 BPM | SYSTOLIC BLOOD PRESSURE: 128 MMHG | DIASTOLIC BLOOD PRESSURE: 70 MMHG | TEMPERATURE: 98.3 F | HEIGHT: 74 IN | OXYGEN SATURATION: 96 % | WEIGHT: 247 LBS

## 2023-05-31 DIAGNOSIS — M54.42 CHRONIC BILATERAL LOW BACK PAIN WITH LEFT-SIDED SCIATICA: ICD-10-CM

## 2023-05-31 DIAGNOSIS — R73.01 IFG (IMPAIRED FASTING GLUCOSE): ICD-10-CM

## 2023-05-31 DIAGNOSIS — N52.34 ERECTILE DYSFUNCTION FOLLOWING SIMPLE PROSTATECTOMY: ICD-10-CM

## 2023-05-31 DIAGNOSIS — Z00.00 ROUTINE GENERAL MEDICAL EXAMINATION AT A HEALTH CARE FACILITY: Primary | ICD-10-CM

## 2023-05-31 DIAGNOSIS — G89.29 CHRONIC BILATERAL LOW BACK PAIN WITH LEFT-SIDED SCIATICA: ICD-10-CM

## 2023-05-31 DIAGNOSIS — E78.2 MIXED HYPERLIPIDEMIA: ICD-10-CM

## 2023-05-31 PROBLEM — M54.32 SCIATICA, LEFT SIDE: Status: RESOLVED | Noted: 2023-04-19 | Resolved: 2023-05-31

## 2023-05-31 PROBLEM — M62.830 BACK MUSCLE SPASM: Status: RESOLVED | Noted: 2023-04-19 | Resolved: 2023-05-31

## 2023-05-31 PROCEDURE — 99396 PREV VISIT EST AGE 40-64: CPT | Performed by: NURSE PRACTITIONER

## 2023-05-31 RX ORDER — GABAPENTIN 100 MG/1
100 CAPSULE ORAL 2 TIMES DAILY
Qty: 60 CAPSULE | Refills: 2 | Status: SHIPPED | OUTPATIENT
Start: 2023-05-31 | End: 2023-06-30

## 2023-05-31 ASSESSMENT — ENCOUNTER SYMPTOMS
WHEEZING: 0
CONSTIPATION: 0
SINUS PAIN: 0
COUGH: 0
BACK PAIN: 1
SINUS PRESSURE: 0
COLOR CHANGE: 0
SHORTNESS OF BREATH: 0
DIARRHEA: 0
ABDOMINAL PAIN: 0

## 2023-05-31 NOTE — ASSESSMENT & PLAN NOTE
Struggling with sciatica pain  We will start gabapentin  Continue diclofenac as needed  Planning on epidural injection in the near future  Following with [facility]. Herman Gold

## 2023-05-31 NOTE — PROGRESS NOTES
Tahira Cool (:  1960) is a 57 y.o. male,Established patient, here for evaluation of the following chief complaint(s): Annual Exam (F/u on back)      ASSESSMENT/PLAN:  1. Routine general medical examination at a health care facility  Assessment & Plan:  Up-to-date on vaccines  Check labs  Well exam in office  Due for colonoscopy this year    Orders:  -     CBC with Auto Differential; Future  -     Comprehensive Metabolic Panel; Future  2. Mixed hyperlipidemia  Assessment & Plan:  Stable, controlled  Continue Lipitor  Check lipids  Orders:  -     Lipid Panel; Future  3. IFG (impaired fasting glucose)  -     Hemoglobin A1C; Future  4. Erectile dysfunction following simple prostatectomy  Assessment & Plan:  Stable, controlled  Continue Cialis  Orders:  -     PSA; Future  5. Chronic bilateral low back pain with left-sided sciatica  Assessment & Plan:  Struggling with sciatica pain  We will start gabapentin  Continue diclofenac as needed  Planning on epidural injection in the near future  Following with [facility]. Ron Ahumada      No follow-ups on file. SUBJECTIVE/OBJECTIVE:  HPI  Patient is here for physical as well as follow-up on his low back pain. He continues to struggle with lower back pain. He did find relief with steroid taper but then symptoms gradually returned. He does have an appointment with next month with Dr. Ron Ahumada for epidural injection. Pain radiates down the left leg. Does not extend beyond the knee. He denies weakness in that leg. He is otherwise feeling well. Compliant with medications. Has been working on low-fat diet. Compliant with Lipitor. Current Outpatient Medications   Medication Sig Dispense Refill    gabapentin (NEURONTIN) 100 MG capsule Take 1 capsule by mouth 2 times daily for 30 days.  Intended supply: 90 days 60 capsule 2    diclofenac (VOLTAREN) 50 MG EC tablet Take 1 tablet by mouth twice daily 60 tablet 0    omeprazole (PRILOSEC) 40 MG delayed release

## 2023-06-02 DIAGNOSIS — Z00.00 ROUTINE GENERAL MEDICAL EXAMINATION AT A HEALTH CARE FACILITY: ICD-10-CM

## 2023-06-02 DIAGNOSIS — R73.01 IFG (IMPAIRED FASTING GLUCOSE): ICD-10-CM

## 2023-06-02 DIAGNOSIS — N52.34 ERECTILE DYSFUNCTION FOLLOWING SIMPLE PROSTATECTOMY: ICD-10-CM

## 2023-06-02 DIAGNOSIS — E78.2 MIXED HYPERLIPIDEMIA: ICD-10-CM

## 2023-06-02 LAB
ALBUMIN SERPL-MCNC: 4.3 G/DL (ref 3.4–5)
ALBUMIN/GLOB SERPL: 1.5 {RATIO} (ref 1.1–2.2)
ALP SERPL-CCNC: 83 U/L (ref 40–129)
ALT SERPL-CCNC: 33 U/L (ref 10–40)
ANION GAP SERPL CALCULATED.3IONS-SCNC: 12 MMOL/L (ref 3–16)
AST SERPL-CCNC: 22 U/L (ref 15–37)
BASOPHILS # BLD: 0 K/UL (ref 0–0.2)
BASOPHILS NFR BLD: 0.3 %
BILIRUB SERPL-MCNC: 0.4 MG/DL (ref 0–1)
BUN SERPL-MCNC: 14 MG/DL (ref 7–20)
CALCIUM SERPL-MCNC: 8.9 MG/DL (ref 8.3–10.6)
CHLORIDE SERPL-SCNC: 102 MMOL/L (ref 99–110)
CHOLEST SERPL-MCNC: 141 MG/DL (ref 0–199)
CO2 SERPL-SCNC: 26 MMOL/L (ref 21–32)
CREAT SERPL-MCNC: 1.1 MG/DL (ref 0.8–1.3)
DEPRECATED RDW RBC AUTO: 15 % (ref 12.4–15.4)
EOSINOPHIL # BLD: 0.1 K/UL (ref 0–0.6)
EOSINOPHIL NFR BLD: 2.2 %
GFR SERPLBLD CREATININE-BSD FMLA CKD-EPI: >60 ML/MIN/{1.73_M2}
GLUCOSE SERPL-MCNC: 92 MG/DL (ref 70–99)
HCT VFR BLD AUTO: 38.1 % (ref 40.5–52.5)
HDLC SERPL-MCNC: 38 MG/DL (ref 40–60)
HGB BLD-MCNC: 12.7 G/DL (ref 13.5–17.5)
LDLC SERPL CALC-MCNC: 84 MG/DL
LYMPHOCYTES # BLD: 1.2 K/UL (ref 1–5.1)
LYMPHOCYTES NFR BLD: 20.4 %
MCH RBC QN AUTO: 26.1 PG (ref 26–34)
MCHC RBC AUTO-ENTMCNC: 33.3 G/DL (ref 31–36)
MCV RBC AUTO: 78.2 FL (ref 80–100)
MONOCYTES # BLD: 1 K/UL (ref 0–1.3)
MONOCYTES NFR BLD: 16.5 %
NEUTROPHILS # BLD: 3.6 K/UL (ref 1.7–7.7)
NEUTROPHILS NFR BLD: 60.6 %
PLATELET # BLD AUTO: 270 K/UL (ref 135–450)
PMV BLD AUTO: 7.5 FL (ref 5–10.5)
POTASSIUM SERPL-SCNC: 5.1 MMOL/L (ref 3.5–5.1)
PROT SERPL-MCNC: 7.2 G/DL (ref 6.4–8.2)
PSA SERPL DL<=0.01 NG/ML-MCNC: 0.02 NG/ML (ref 0–4)
RBC # BLD AUTO: 4.87 M/UL (ref 4.2–5.9)
SODIUM SERPL-SCNC: 140 MMOL/L (ref 136–145)
TRIGL SERPL-MCNC: 96 MG/DL (ref 0–150)
VLDLC SERPL CALC-MCNC: 19 MG/DL
WBC # BLD AUTO: 5.9 K/UL (ref 4–11)

## 2023-06-03 LAB
EST. AVERAGE GLUCOSE BLD GHB EST-MCNC: 114 MG/DL
HBA1C MFR BLD: 5.6 %

## 2023-06-05 ENCOUNTER — TELEPHONE (OUTPATIENT)
Dept: FAMILY MEDICINE CLINIC | Age: 63
End: 2023-06-05

## 2023-06-05 NOTE — TELEPHONE ENCOUNTER
Pt calling asking for lab results. Pt also mentioned that the gabapentin that was recently prescribed was making him 'foggy' and gave him both chills and sweats. Pt wondering if there is any alternative that may be prescribed.

## 2023-06-06 NOTE — TELEPHONE ENCOUNTER
Spoke to pt and relayed information regarding Medication alternatives. Pt confirmed understanding with no additional questions or concerns.

## 2023-06-06 NOTE — TELEPHONE ENCOUNTER
No other nerve pain medication that would not make him feel foggy. This might get better after a while. Would advise taking only once a day to start.

## 2023-06-20 RX ORDER — OMEPRAZOLE 40 MG/1
CAPSULE, DELAYED RELEASE ORAL
Qty: 90 CAPSULE | Refills: 0 | Status: SHIPPED | OUTPATIENT
Start: 2023-06-20

## 2023-06-30 PROBLEM — Z00.00 ROUTINE GENERAL MEDICAL EXAMINATION AT A HEALTH CARE FACILITY: Status: RESOLVED | Noted: 2021-06-03 | Resolved: 2023-06-30

## 2023-07-03 NOTE — TELEPHONE ENCOUNTER
Requested Prescriptions     Pending Prescriptions Disp Refills    diclofenac (VOLTAREN) 50 MG EC tablet [Pharmacy Med Name: Diclofenac Sodium 50 MG Oral Tablet Delayed Release] 60 tablet 0     Sig: Take 1 tablet by mouth twice daily          Last Office Visit: 4/19/2023     Next Office Visit: Visit date not found     Last Labs: 6/2/23

## 2023-07-31 NOTE — TELEPHONE ENCOUNTER
Requested Prescriptions     Pending Prescriptions Disp Refills    diclofenac (VOLTAREN) 50 MG EC tablet [Pharmacy Med Name: Diclofenac Sodium 50 MG Oral Tablet Delayed Release] 60 tablet 0     Sig: Take 1 tablet by mouth twice daily         Last OV: 5/31/2023     Last labs: 6/2/2023     F/u: N/A

## 2023-10-31 RX ORDER — OMEPRAZOLE 40 MG/1
CAPSULE, DELAYED RELEASE ORAL
Qty: 90 CAPSULE | Refills: 0 | Status: SHIPPED | OUTPATIENT
Start: 2023-10-31

## 2023-10-31 NOTE — TELEPHONE ENCOUNTER
Requested Prescriptions     Pending Prescriptions Disp Refills    omeprazole (PRILOSEC) 40 MG delayed release capsule [Pharmacy Med Name: Omeprazole 40 MG Oral Capsule Delayed Release] 90 capsule 0     Sig: Take 1 capsule by mouth once daily          Last Office Visit: 5/31/2023     Next Office Visit: Visit date not found     Last Labs: 6/2/2023

## (undated) DEVICE — GLOVE SURG SZ 75 L12IN FNGR THK13MIL BRN LTX SYN POLYMER W

## (undated) DEVICE — Device: Brand: JELCO

## (undated) DEVICE — ZIMMER® STERILE DISPOSABLE TOURNIQUET CUFF WITH PLC, DUAL PORT, SINGLE BLADDER, 18 IN. (46 CM)

## (undated) DEVICE — TOWEL,OR,DSP,ST,BLUE,STD,4/PK,20PK/CS: Brand: MEDLINE

## (undated) DEVICE — DISPOSABLE OR TOWEL: Brand: CARDINAL HEALTH

## (undated) DEVICE — SUTURE VCRL SZ 2-0 L36IN ABSRB UD L36MM CT-1 1/2 CIR J945H

## (undated) DEVICE — DRESSING,GAUZE,XEROFORM,CURAD,1"X8",ST: Brand: CURAD

## (undated) DEVICE — POSITIONER,HEAD,RING CUSHION,9IN,32CS: Brand: MEDLINE

## (undated) DEVICE — SUTURE VCRL SZ 4-0 L18IN ABSRB UD L19MM PS-2 3/8 CIR PRIM J496H

## (undated) DEVICE — UNIVERSAL BLOCK TRAY: Brand: AVANOS*

## (undated) DEVICE — SPLINT CAST W5XL30IN RL FRM WRINKLE FREE INTLOK PERFRMANCE

## (undated) DEVICE — CHLORAPREP 26ML ORANGE

## (undated) DEVICE — DRAPE,U/ SHT,SPLIT,PLAS,STERIL: Brand: MEDLINE

## (undated) DEVICE — STOCKINETTE,IMPERVIOUS,12X48,STERILE: Brand: MEDLINE

## (undated) DEVICE — STANDARD HYPODERMIC NEEDLE,POLYPROPYLENE HUB: Brand: MONOJECT

## (undated) DEVICE — INTENDED FOR TISSUE SEPARATION, AND OTHER PROCEDURES THAT REQUIRE A SHARP SURGICAL BLADE TO PUNCTURE OR CUT.: Brand: BARD-PARKER ® STAINLESS STEEL BLADES

## (undated) DEVICE — SOLUTION IV IRRIG 500ML 0.9% SODIUM CHL 2F7123

## (undated) DEVICE — SYRINGE MED 3ML CLR PLAS STD N CTRL LUERLOCK TIP DISP

## (undated) DEVICE — BANDAGE GZ W2XL75IN ST RAYON POLY CNFRM STRTCH LTWT

## (undated) DEVICE — STERILE POLYISOPRENE POWDER-FREE SURGICAL GLOVES: Brand: PROTEXIS

## (undated) DEVICE — 3M™ COBAN™ SELF-ADHERENT WRAP, 1586S, STERILE, 6 IN X 5 YD (15 CM X 4,5 M), 12 ROLLS/CASE: Brand: 3M™ COBAN™

## (undated) DEVICE — PADDING CAST W4INXL4YD ST COT RAYON MICROPLEATED HIGHLY

## (undated) DEVICE — NEEDLE SPNL 22GA L3.5IN BLK HUB S STL REG WALL FIT STYL W/

## (undated) DEVICE — SET EXTN L7IN PRIMING VOL 0.5ML PRSS RATE STD BOR 1 REM

## (undated) DEVICE — BANDAGE COMPR W4INXL5YD WHT BGE POLY COT M E WRP WV HK AND

## (undated) DEVICE — Device: Brand: AM

## (undated) DEVICE — GAUZE,SPONGE,4"X4",8PLY,STRL,LF,10/TRAY: Brand: MEDLINE

## (undated) DEVICE — GLOVE SURG SZ 7 L12IN THK7.5MIL DK GRN LTX FREE MSG6570] MEDLINE INDUSTRIES INC]

## (undated) DEVICE — BANDAGE COMPR W6INXL10YD ST M E WHITE/BEIGE

## (undated) DEVICE — PAD,ABDOMINAL,8"X10",ST,LF: Brand: MEDLINE

## (undated) DEVICE — SUTURE VCRL SZ 3-0 L18IN ABSRB UD PS-2 L19MM 1/2 CIR J497G

## (undated) DEVICE — GLOVE SURG SZ 7 L12IN FNGR THK79MIL GRN LTX FREE

## (undated) DEVICE — SUTURE COAT VCRL SZ 5-0 L18IN ABSRB UD L19MM PS-2 1/2 CIR J495G

## (undated) DEVICE — MASC TURNOVER KIT: Brand: MEDLINE INDUSTRIES, INC.

## (undated) DEVICE — PEN: MARKING STD 100/CS: Brand: MEDICAL ACTION INDUSTRIES

## (undated) DEVICE — ADHESIVE LIQ 2OZ ADJUNCT FOR DSG MASTISOL

## (undated) DEVICE — HYPODERMIC SAFETY NEEDLE: Brand: MAGELLAN

## (undated) DEVICE — MEDIA CONTRAST RX ISOVUE-300 61% 30ML VIALS

## (undated) DEVICE — SUTURE COAT VCRL SZ 4-0 L18IN ABSRB UD L19MM PS-2 1/2 CIR J496G

## (undated) DEVICE — BANDAGE COBAN 4 IN COMPR W4INXL5YD FOAM COHESIVE QUIK STK SELF ADH SFT

## (undated) DEVICE — T-DRAPE,EXTREMITY,STERILE: Brand: MEDLINE

## (undated) DEVICE — 3M™ STERI-STRIP™ REINFORCED ADHESIVE SKIN CLOSURES, R1546, 1/4 IN X 4 IN (6 MM X 100 MM), 10 STRIPS/ENVELOPE: Brand: 3M™ STERI-STRIP™

## (undated) DEVICE — GLOVE ORANGE PI 7   MSG9070

## (undated) DEVICE — MASTISOL ADHESIVE LIQ 2/3ML

## (undated) DEVICE — PACK PROCEDURE SURG EXTREMITY MFFOP CUST

## (undated) DEVICE — APPLICATOR MEDICATED 26 CC SOLUTION HI LT ORNG CHLORAPREP

## (undated) DEVICE — NEEDLE SPNL 22GA L5IN BLK HUB S STL W/ QNCKE PNT W/OUT

## (undated) DEVICE — SUTURE ETHLN SZ 3-0 L18IN NONABSORBABLE BLK PS-2 L19MM 3/8 1669H

## (undated) DEVICE — BANDAGE COMPR W4INXL12FT E DISP ESMARCH EVEN